# Patient Record
Sex: FEMALE | Race: WHITE | HISPANIC OR LATINO | Employment: FULL TIME | URBAN - METROPOLITAN AREA
[De-identification: names, ages, dates, MRNs, and addresses within clinical notes are randomized per-mention and may not be internally consistent; named-entity substitution may affect disease eponyms.]

---

## 2017-01-09 ENCOUNTER — ALLSCRIPTS OFFICE VISIT (OUTPATIENT)
Dept: OTHER | Facility: OTHER | Age: 52
End: 2017-01-09

## 2017-01-18 ENCOUNTER — ALLSCRIPTS OFFICE VISIT (OUTPATIENT)
Dept: OTHER | Facility: OTHER | Age: 52
End: 2017-01-18

## 2017-01-25 ENCOUNTER — TRANSCRIBE ORDERS (OUTPATIENT)
Dept: ADMINISTRATIVE | Facility: HOSPITAL | Age: 52
End: 2017-01-25

## 2017-01-25 DIAGNOSIS — I83.893 SYMPTOMATIC VARICOSE VEINS OF BOTH LOWER EXTREMITIES: Primary | ICD-10-CM

## 2017-01-31 ENCOUNTER — HOSPITAL ENCOUNTER (OUTPATIENT)
Dept: RADIOLOGY | Facility: HOSPITAL | Age: 52
Discharge: HOME | End: 2017-01-31
Attending: SURGERY

## 2017-01-31 DIAGNOSIS — I83.893 SYMPTOMATIC VARICOSE VEINS OF BOTH LOWER EXTREMITIES: ICD-10-CM

## 2017-01-31 PROCEDURE — 93971 EXTREMITY STUDY: CPT

## 2017-04-10 ENCOUNTER — ALLSCRIPTS OFFICE VISIT (OUTPATIENT)
Dept: OTHER | Facility: OTHER | Age: 52
End: 2017-04-10

## 2017-04-18 ENCOUNTER — ALLSCRIPTS OFFICE VISIT (OUTPATIENT)
Dept: OTHER | Facility: OTHER | Age: 52
End: 2017-04-18

## 2017-04-19 ENCOUNTER — ALLSCRIPTS OFFICE VISIT (OUTPATIENT)
Dept: OTHER | Facility: OTHER | Age: 52
End: 2017-04-19

## 2017-04-19 DIAGNOSIS — Z12.31 ENCOUNTER FOR SCREENING MAMMOGRAM FOR MALIGNANT NEOPLASM OF BREAST: ICD-10-CM

## 2017-04-19 DIAGNOSIS — R63.5 ABNORMAL WEIGHT GAIN: ICD-10-CM

## 2017-04-19 DIAGNOSIS — E66.01 MORBID (SEVERE) OBESITY DUE TO EXCESS CALORIES (HCC): ICD-10-CM

## 2017-04-19 DIAGNOSIS — I83.893 VARICOSE VEINS OF BILATERAL LOWER EXTREMITIES WITH OTHER COMPLICATIONS: ICD-10-CM

## 2017-05-10 ENCOUNTER — ALLSCRIPTS OFFICE VISIT (OUTPATIENT)
Dept: OTHER | Facility: OTHER | Age: 52
End: 2017-05-10

## 2017-05-25 ENCOUNTER — ALLSCRIPTS OFFICE VISIT (OUTPATIENT)
Dept: OTHER | Facility: OTHER | Age: 52
End: 2017-05-25

## 2017-05-25 DIAGNOSIS — R92.8 OTHER ABNORMAL AND INCONCLUSIVE FINDINGS ON DIAGNOSTIC IMAGING OF BREAST: ICD-10-CM

## 2017-05-25 DIAGNOSIS — Z12.31 ENCOUNTER FOR SCREENING MAMMOGRAM FOR MALIGNANT NEOPLASM OF BREAST: ICD-10-CM

## 2017-06-16 ENCOUNTER — HOSPITAL ENCOUNTER (OUTPATIENT)
Dept: RADIOLOGY | Age: 52
Discharge: HOME | End: 2017-06-16
Payer: COMMERCIAL

## 2017-06-16 DIAGNOSIS — Z12.31 ENCOUNTER FOR SCREENING MAMMOGRAM FOR MALIGNANT NEOPLASM OF BREAST: ICD-10-CM

## 2017-06-16 PROCEDURE — 77063 BREAST TOMOSYNTHESIS BI: CPT

## 2017-06-16 PROCEDURE — G0202 SCR MAMMO BI INCL CAD: HCPCS

## 2017-06-27 ENCOUNTER — HOSPITAL ENCOUNTER (OUTPATIENT)
Dept: ULTRASOUND IMAGING | Facility: CLINIC | Age: 52
Discharge: HOME | End: 2017-06-27
Payer: COMMERCIAL

## 2017-06-27 ENCOUNTER — GENERIC CONVERSION - ENCOUNTER (OUTPATIENT)
Dept: OTHER | Facility: OTHER | Age: 52
End: 2017-06-27

## 2017-06-27 ENCOUNTER — HOSPITAL ENCOUNTER (OUTPATIENT)
Dept: MAMMOGRAPHY | Facility: CLINIC | Age: 52
Discharge: HOME | End: 2017-06-27
Payer: COMMERCIAL

## 2017-06-27 DIAGNOSIS — R92.8 OTHER ABNORMAL AND INCONCLUSIVE FINDINGS ON DIAGNOSTIC IMAGING OF BREAST: ICD-10-CM

## 2017-06-27 DIAGNOSIS — R92.8 ABNORMAL MAMMOGRAM, UNSPECIFIED: ICD-10-CM

## 2017-06-27 PROCEDURE — G0206 DX MAMMO INCL CAD UNI: HCPCS

## 2017-06-27 PROCEDURE — 76642 ULTRASOUND BREAST LIMITED: CPT

## 2017-06-28 ENCOUNTER — GENERIC CONVERSION - ENCOUNTER (OUTPATIENT)
Dept: OTHER | Facility: OTHER | Age: 52
End: 2017-06-28

## 2017-07-06 ENCOUNTER — ALLSCRIPTS OFFICE VISIT (OUTPATIENT)
Dept: OTHER | Facility: OTHER | Age: 52
End: 2017-07-06

## 2017-09-20 ENCOUNTER — GENERIC CONVERSION - ENCOUNTER (OUTPATIENT)
Dept: OTHER | Facility: OTHER | Age: 52
End: 2017-09-20

## 2017-12-11 DIAGNOSIS — R92.8 OTHER ABNORMAL AND INCONCLUSIVE FINDINGS ON DIAGNOSTIC IMAGING OF BREAST: ICD-10-CM

## 2017-12-11 DIAGNOSIS — M62.838 OTHER MUSCLE SPASM: ICD-10-CM
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
## 2017-12-11 DIAGNOSIS — M54.2 CERVICALGIA: ICD-10-CM

## 2017-12-22 ENCOUNTER — GENERIC CONVERSION - ENCOUNTER (OUTPATIENT)
Dept: OTHER | Facility: OTHER | Age: 52
End: 2017-12-22

## 2017-12-28 ENCOUNTER — HOSPITAL ENCOUNTER (OUTPATIENT)
Dept: MAMMOGRAPHY | Facility: CLINIC | Age: 52
Discharge: HOME | End: 2017-12-28
Payer: COMMERCIAL

## 2017-12-28 DIAGNOSIS — R92.8 OTHER ABNORMAL AND INCONCLUSIVE FINDINGS ON DIAGNOSTIC IMAGING OF BREAST: ICD-10-CM

## 2017-12-28 PROCEDURE — G0206 DX MAMMO INCL CAD UNI: HCPCS

## 2017-12-28 PROCEDURE — G0279 TOMOSYNTHESIS, MAMMO: HCPCS

## 2018-01-09 ENCOUNTER — APPOINTMENT (OUTPATIENT)
Dept: PHYSICAL THERAPY | Facility: CLINIC | Age: 53
End: 2018-01-09
Payer: COMMERCIAL

## 2018-01-09 DIAGNOSIS — M54.2 CERVICALGIA: ICD-10-CM

## 2018-01-09 DIAGNOSIS — M62.838 OTHER MUSCLE SPASM: ICD-10-CM
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
## 2018-01-09 PROCEDURE — G8985 CARRY GOAL STATUS: HCPCS

## 2018-01-09 PROCEDURE — 97162 PT EVAL MOD COMPLEX 30 MIN: CPT

## 2018-01-09 PROCEDURE — G8984 CARRY CURRENT STATUS: HCPCS

## 2018-01-13 VITALS
SYSTOLIC BLOOD PRESSURE: 118 MMHG | WEIGHT: 293 LBS | HEART RATE: 96 BPM | BODY MASS INDEX: 47.09 KG/M2 | TEMPERATURE: 97.7 F | DIASTOLIC BLOOD PRESSURE: 80 MMHG | HEIGHT: 66 IN

## 2018-01-13 VITALS
HEART RATE: 82 BPM | HEIGHT: 66 IN | DIASTOLIC BLOOD PRESSURE: 78 MMHG | BODY MASS INDEX: 47.09 KG/M2 | SYSTOLIC BLOOD PRESSURE: 134 MMHG | WEIGHT: 293 LBS | TEMPERATURE: 98.1 F

## 2018-01-14 VITALS
WEIGHT: 293 LBS | SYSTOLIC BLOOD PRESSURE: 122 MMHG | HEIGHT: 66 IN | HEART RATE: 80 BPM | BODY MASS INDEX: 47.09 KG/M2 | DIASTOLIC BLOOD PRESSURE: 80 MMHG | TEMPERATURE: 97.5 F

## 2018-01-14 VITALS
TEMPERATURE: 97.8 F | HEART RATE: 86 BPM | WEIGHT: 293 LBS | HEIGHT: 66 IN | BODY MASS INDEX: 47.09 KG/M2 | DIASTOLIC BLOOD PRESSURE: 80 MMHG | SYSTOLIC BLOOD PRESSURE: 142 MMHG

## 2018-01-14 VITALS
WEIGHT: 292 LBS | SYSTOLIC BLOOD PRESSURE: 150 MMHG | DIASTOLIC BLOOD PRESSURE: 99 MMHG | HEIGHT: 66 IN | BODY MASS INDEX: 46.93 KG/M2

## 2018-01-15 VITALS
SYSTOLIC BLOOD PRESSURE: 154 MMHG | HEART RATE: 78 BPM | HEIGHT: 66 IN | TEMPERATURE: 97.5 F | DIASTOLIC BLOOD PRESSURE: 100 MMHG

## 2018-01-16 ENCOUNTER — APPOINTMENT (OUTPATIENT)
Dept: PHYSICAL THERAPY | Facility: CLINIC | Age: 53
End: 2018-01-16
Payer: COMMERCIAL

## 2018-01-16 NOTE — MISCELLANEOUS
Reason For Visit  Reason For Visit Free Text Note Form: Assistance with Medical Coverage for possible medical procedure     Case Management Documentation [facility]:   Information obtained from the patient and medical record  Patient's financial status no medical insurance  She is also dealing with additional issues such as language/communication barrier and chronic/terminal disease  Action Plan: supportive counseling/advocacy  plan reviewed  Progress Note  SW has met with pt to assist as possible with obtaining medical coverage for possible medical procedure  SW spoke with pt via  ID [id number]  Pt has been having vascular problems and may require laser ablation as per MD note  Pt does not have Insurance and is NOT eligible for MA or JAM  Pt is known to our Financial Counselor  Pt Accounts  MD will need to determine if this is an Urgent and Medically required procedure  If so it will get scheduled and get referred to pt accounts  If not will refer pt to [facility] for same  Supportive counseling provided  SW to remain available to support and assist pt as indicated  Active Problems    1  Complicated varicose veins, bilateral (454 8) (P20 621)   2  Encounter to discuss test results (V65 49) (Z71 89)   3  Morbid obesity (278 01) (E66 01)   4  Need for influenza vaccination (V04 81) (Z23)   5  Renal cyst (753 10) (N28 1)   6  Varicose veins (454 9) (I86 8)   7  Venous ulcer of right leg (454 0) (I83 019)   8  Vulvar lesion (624 8) (N90 89)   9  Women's annual routine gynecological examination (V72 31) (Z01 419)    Current Meds   1  No Reported Medications Recorded    Allergies    1  No Known Drug Allergies    2  No Known Environmental Allergies   3   No Known Food Allergies    Future Appointments    Date/Time Provider Specialty Site   04/19/2017 02:15 PM Specialty Clinic, Vascular  [facility]     Signatures   Electronically signed by : Laura Duncan LCSW; Apr 10 2017 5:54PM EST                       (Author)

## 2018-01-16 NOTE — MISCELLANEOUS
Message  telephone call to patient with   Informed of need for 6 month f/u mammogram  pt  states she was made aware and given card by breast center      Active Problems    1  Abnormal mammogram of right breast (793 80) (R92 8)   2  Abnormal weight gain (783 1) (R63 5)   3  Female pelvic pain (625 9) (R10 2)   4  Morbid obesity (278 01) (E66 01)   5  Renal cyst (753 10) (N28 1)   6  Varicose veins of both lower extremities with other complications (200 2) (R73 862)   7  Venous ulcer of right leg (454 0) (I83 019)   8  Visit for screening mammogram (V76 12) (Z12 31)   9  Women's annual routine gynecological examination (V72 31) (Z01 419)    Current Meds   1  Tylenol 325 MG Oral Tablet; Therapy: (Recorded:[id number]) to Recorded    Allergies    1  No Known Drug Allergies    2  No Known Environmental Allergies   3   No Known Food Allergies    Signatures   Electronically signed by : Babatunde Nichols RN; Jun 28 2017  3:46PM EST                       (Author)

## 2018-01-18 ENCOUNTER — APPOINTMENT (OUTPATIENT)
Dept: PHYSICAL THERAPY | Facility: CLINIC | Age: 53
End: 2018-01-18
Payer: COMMERCIAL

## 2018-01-18 ENCOUNTER — ALLSCRIPTS OFFICE VISIT (OUTPATIENT)
Dept: OTHER | Facility: OTHER | Age: 53
End: 2018-01-18

## 2018-01-18 DIAGNOSIS — R10.2 PELVIC AND PERINEAL PAIN: ICD-10-CM

## 2018-01-18 DIAGNOSIS — Z11.3 ENCOUNTER FOR SCREENING FOR INFECTIONS WITH PREDOMINANTLY SEXUAL MODE OF TRANSMISSION: ICD-10-CM

## 2018-01-18 LAB
BACTERIA UR QL AUTO: NORMAL
CLUE CELL (HISTORICAL): NORMAL
HYPHAL YEAST (HISTORICAL): NORMAL
KOH PREP (HISTORICAL): NORMAL
PH UR STRIP.AUTO: 4 [PH]
TRICHOMONAS (HISTORICAL): NORMAL

## 2018-01-18 PROCEDURE — 87491 CHLMYD TRACH DNA AMP PROBE: CPT | Performed by: NURSE PRACTITIONER

## 2018-01-18 PROCEDURE — 87591 N.GONORRHOEAE DNA AMP PROB: CPT | Performed by: NURSE PRACTITIONER

## 2018-01-19 ENCOUNTER — LAB REQUISITION (OUTPATIENT)
Dept: LAB | Facility: HOSPITAL | Age: 53
End: 2018-01-19

## 2018-01-19 DIAGNOSIS — Z11.3 ENCOUNTER FOR SCREENING FOR INFECTIONS WITH PREDOMINANTLY SEXUAL MODE OF TRANSMISSION: ICD-10-CM

## 2018-01-19 LAB
CHLAMYDIA DNA CVX QL NAA+PROBE: NORMAL
N GONORRHOEA DNA GENITAL QL NAA+PROBE: NORMAL

## 2018-01-22 VITALS
HEIGHT: 66 IN | TEMPERATURE: 98.1 F | DIASTOLIC BLOOD PRESSURE: 98 MMHG | SYSTOLIC BLOOD PRESSURE: 156 MMHG | HEART RATE: 82 BPM | WEIGHT: 293 LBS | BODY MASS INDEX: 47.09 KG/M2

## 2018-01-23 ENCOUNTER — APPOINTMENT (OUTPATIENT)
Dept: PHYSICAL THERAPY | Facility: CLINIC | Age: 53
End: 2018-01-23
Payer: COMMERCIAL

## 2018-01-23 VITALS
HEIGHT: 65 IN | HEART RATE: 75 BPM | DIASTOLIC BLOOD PRESSURE: 86 MMHG | WEIGHT: 293 LBS | BODY MASS INDEX: 48.82 KG/M2 | SYSTOLIC BLOOD PRESSURE: 126 MMHG

## 2018-01-24 VITALS
WEIGHT: 293 LBS | TEMPERATURE: 97.7 F | SYSTOLIC BLOOD PRESSURE: 124 MMHG | HEIGHT: 65 IN | DIASTOLIC BLOOD PRESSURE: 90 MMHG | HEART RATE: 100 BPM | BODY MASS INDEX: 48.82 KG/M2

## 2018-01-25 ENCOUNTER — APPOINTMENT (OUTPATIENT)
Dept: PHYSICAL THERAPY | Facility: CLINIC | Age: 53
End: 2018-01-25
Payer: COMMERCIAL

## 2018-02-14 PROBLEM — I83.90 VARICOSE VEINS OF LOWER EXTREMITY: Status: ACTIVE | Noted: 2017-01-18

## 2018-02-14 NOTE — PROGRESS NOTES
Assessment/Plan:    I have sent an approximate 2 year pharmacy to take as needed up to twice a day for pain  I have sent cyclobenzaprine a muscle relaxant that you can take as needed more at night if the pain flares up  Continue plenty of liquids and rest for cold  If right-sided back pain does not improve or gets worse then return to the office for an appointment  No problem-specific Assessment & Plan notes found for this encounter  Diagnoses and all orders for this visit:    Need for influenza vaccination    Acute right-sided low back pain without sciatica    Viral upper respiratory tract infection    Other orders  -     Cancel: Occult Bloood,Fecal Immunochemical; Future  -     Cancel: Flu vaccine greater than or equal to 4yo preservative free IM          Subjective:      Patient ID: Tejal Garcia is a 45 y o  female  Patient here with c/o R sided lower lateral back pain  Reports started about 20 days ago but is increasing  Denies any trauma or injury although 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  States feels a lump in the area and is pain to touch  But when ask to point to area no pain to  Touch  Patient reports when has taken naproxen for this pain the pain will completely go away for 3 days  Patient reports during this time she has no pain with working or at rest   Patient reports after 3 days the pain does return  Reports went for the ultrasound for gyn today  Patient reports also getting over a cold from 2 , reports still has a mild cough but it is significantly improving  Aware f/u diagnostic mammo in Dec was normal and to return to routine bilat screening ammo June 2018  The following portions of the patient's history were reviewed and updated as appropriate: allergies, current medications, past family history, past medical history, past social history, past surgical history and problem list     Review of Systems   Constitutional: Negative  HENT: Positive for congestion  Respiratory: Positive for cough  As noted in HPI   Cardiovascular: Negative  Gastrointestinal: Negative  Genitourinary: Negative for decreased urine volume, difficulty urinating and frequency  No change to bowel or bladder function  Musculoskeletal: Positive for back pain and myalgias  Neurological: Negative  Negative for weakness and numbness  Hematological: Negative  Psychiatric/Behavioral: Negative  Objective:    Vitals:    02/15/18 1456   BP: 120/82   Pulse: 80   Temp: 97 9 °F (36 6 °C)        Physical Exam   Constitutional: She appears well-developed and well-nourished  Cardiovascular: Normal rate, regular rhythm and normal heart sounds  Exam reveals no gallop and no friction rub  No murmur heard  Pulmonary/Chest: Effort normal and breath sounds normal  She has no wheezes  Abdominal: Soft  Obese exam limited by body habitus no organomegaly noted     Musculoskeletal:        Back:

## 2018-02-15 ENCOUNTER — OFFICE VISIT (OUTPATIENT)
Dept: INTERNAL MEDICINE CLINIC | Facility: CLINIC | Age: 53
End: 2018-02-15

## 2018-02-15 VITALS
TEMPERATURE: 97.9 F | HEART RATE: 80 BPM | WEIGHT: 293 LBS | SYSTOLIC BLOOD PRESSURE: 120 MMHG | HEIGHT: 65 IN | BODY MASS INDEX: 48.82 KG/M2 | DIASTOLIC BLOOD PRESSURE: 82 MMHG

## 2018-02-15 DIAGNOSIS — J06.9 VIRAL UPPER RESPIRATORY TRACT INFECTION: ICD-10-CM

## 2018-02-15 DIAGNOSIS — Z23 NEED FOR INFLUENZA VACCINATION: Primary | ICD-10-CM

## 2018-02-15 DIAGNOSIS — M54.50 ACUTE RIGHT-SIDED LOW BACK PAIN WITHOUT SCIATICA: ICD-10-CM

## 2018-02-15 PROCEDURE — 99213 OFFICE O/P EST LOW 20 MIN: CPT | Performed by: PHYSICIAN ASSISTANT

## 2018-02-15 RX ORDER — NAPROXEN 500 MG/1
500 TABLET ORAL 2 TIMES DAILY WITH MEALS
Qty: 40 TABLET | Refills: 0 | Status: SHIPPED | OUTPATIENT
Start: 2018-02-15 | End: 2018-04-01 | Stop reason: ALTCHOICE

## 2018-02-15 RX ORDER — CYCLOBENZAPRINE HCL 10 MG
10 TABLET ORAL 2 TIMES DAILY PRN
Qty: 14 TABLET | Refills: 0 | Status: SHIPPED | OUTPATIENT
Start: 2018-02-15 | End: 2018-04-01 | Stop reason: ALTCHOICE

## 2018-02-15 NOTE — PATIENT INSTRUCTIONS
Dolor mayra de espalda inferior   LO QUE NECESITA SABER:   ¿Qué es el dolor mayra de la región inferior de la espalda? El dolor mayra de la región lumbar de la espalda es karyn molestia repentina en la parte inferior de echeverria espalda que dura hasta por 6 semanas  La molestia hace que sea dificil que usted tolere la [place]  ¿Qué causa o aumenta mi riesgo de tener dolor mayra de la parte inferior de la espalda? Las condiciones que afectan la columna, las articulaciones, o los músculos pueden causar el dolor de espalda  Estos pueden incluir la artritis, estenosis de la isiah dorsal (estrechamiento de la columna vertebral), tensión muscular o descomposición de los discos de la columna vertebral  Los siguientes aumentan echeverria riesgo de presentar dolor de espalda:  · Inclinarse o levantar de karyn forma repetitiva o girar o levantar artículos pesados    · Lesión debido a karyn caída o accidente    · Falta de actividad física regular     · Obesidad, embarazo     · Fumar    · Envejecimiento    · Manejar, estar sentado o de pie por mucho tiempo    · Zhane postura mientras está sentado o de pie  ¿Cómo se diagnostica el dolor mayra de la parte inferior de la espalda? Echeverria médico le preguntará acerca de echeverria historial médico y lo examinará  Puede que le pregunte cuándo fue echeverria último dolor de lumbago de la espalda cómo fue que comenzó  Muéstrele dónde siente el dolor y qué lo mejora o lo Kathyrn Peasant  Dígale acerca del tipo de dolor que tiene, qué tan cha es, y cuánto tiempo dura  Dígale si el dolor empeora por las noches o cuando se [place]  ¿Cómo se trata el dolor lumbar mayra? La meta del tratamiento es aliviar echeverria dolor y ayudarlo a tolerar la [place]  La mayoría de las personas que tienen dolor mayra de la parte inferior de la espalda se mejoran entre unas 4 a 6 semanas  Es posible que usted necesite alguno de los siguientes:  · Medicamentos:      ¨ El acetaminofén  gio el dolor   Está disponible sin receta médica  Pregunte la cantidad y la frecuencia con que debe tomarlos  [address]  El acetaminofén puede causar daño en el hígado cuando no se norm de forma correcta  ¨ AINEs (Analgésicos antiinflamatorios no esteroides)  ayudan a disminuir la inflamación y el dolor  Julissa medicamento esta disponible con o sin karyn receta médica  Los AINEs pueden causar sangrado estomacal o problemas renales en ciertas personas  Si usted norm un medicamento anticoagulante, siempre pregúntele a echeverria médico si los DINORAH son seguros para usted  Siempre harley la etiqueta de julissa medicamento y [place] instrucciones  ¨ Un medicamento con receta para el dolor  podrían ser Elnor Keel  Pregunte al médico cómo debe olive julissa medicamento de forma ortiz  ¨ Relajantes musculares  disminuyen el dolor y [employer] músculos de la parte inferior de la columna  ¿Qué puedo hacer para prevenir el dolor mayra de la parte inferior de la espalda? · Use la mecánica corporal adecuada  ¨ Flexione la cadera y las rodillas cuando [place] [employer] a levantar un objeto  No doble la cintura  Utilice los [employer] de las piernas mientras levanta echeverria carga  No use echeverria espalda  Mantenga el objeto cerca de echeverria pecho mientras lo levanta  No se tuerza, ni levante cualquier cosa por encima de echeverria cintura  ¨ Cambie echeverria posición frecuentemente cuando pase mucho tiempo de pie  Descanse un pie sobre karyn Vonzella Ndiaye o un reposapiés e intercambie con el otro pie frecuentemente  ¨ No permanezca sentado por lapsos de tiempo prolongados  Cuando sea necesario hacerlo, siéntese en karyn silla de respaldo recto con los pies apoyados en el suelo  Nunca alcance, jale ni empuje mientras se encuentra sentando  · Rohith ejercicios que fortalezcan aneta músculos de la espalda  Entre en calor antes de hacer ejercicio  Consulte con echeverria médico sobre [employer] plan de ejercicios para usted  · Mantenga un peso saludable  Consulte con echeverria médico cuánto debería pesar   Pida que le ayude a crear un plan para bajar de peso si usted tiene sobrepeso  ¿Cómo puedo cuidarme si tengo dolor en la parte inferior de la espalda? · Manténgase activo  lo más que pueda sin causar [employer]  El reposo en cama puede empeorar echeverria dolor de espalda  Comience con ejercicios ligeros virgie caminar  Evite levantar objetos hasta que ya no tenga dolor  Solicite más información acerca de las actividades físicas o plan de ejercicios que son los adecuados para usted  · El hielo  ayuda a disminuir la inflamación, el dolor y los espasmos musculares  Ponga hielo ion en karyn bolsa plástica  Cúbrala con karyn toalla  Aplíquela en echeverria luna lumbar por 20 a 30 minutos cada 2 horas  Rohith esto por 2 a 3 días después que el dolor empiece, o según lo indicado  · El calor  ayuda a disminuir dolor y espasmos musculares  Empiece a utilizar calor después de jaison terminado el tratamiento con el hielo  Utilice karyn toalla pequeña empapada con [place], karyn almohada térmica o tome un baño de sabrina con agua tibia  Aplíquese calor en el área lesionada barby 20 a 30 minutos cada 2 horas barby la cantidad de [employer] indiquen  [place] entre el calor y el hielo  ¿Cuándo reid comunicarme con mi médico?   · Usted tiene fiebre  · Usted tiene un dolor por la noche o cuando descansa  · Echeverria dolor no mejora con el tratamiento  · Usted tiene dolor que empeora cuando tose o estornuda  · Usted siente un estallido o chasquido repentino en echeverria espalda  · Usted tiene preguntas o inquietudes acerca de echeverria condición o cuidado  ¿Cuándo reid buscar atención inmediata o llamar al 911? · Usted tiene dolor intenso  · Usted repentinamente tiene rigidez o siente pesadez en ambos glúteos hacia abajo de ambas piernas  · Usted tiene entumecimiento o debilidad en karyn pierna o dolor en ambas piernas  · Usted tiene entumecimiento en el área genital o en la región lumbar      · Usted no puede controlar echeverria orina ni aneta deposiciones intestinales  ACUERDOS SOBRE DELEON CUIDADO:   Usted tiene el derecho de ayudar a planear deleon cuidado  Aprenda todo lo que pueda sobre deleon condición y virgie darle tratamiento  Discuta aneta opciones de tratamiento con aneta médicos para decidir el cuidado que usted desea recibir  Usted siempre tiene el derecho de rechazar el tratamiento  Esta información es sólo para uso en educación  Deleon intención no es darle un consejo médico sobre enfermedades o tratamientos  Colsulte con deleon [place] farmacéutico antes de seguir cualquier régimen médico para saber si es seguro y efectivo para usted  © 2017 [address] Information is for End User's use only and may not be sold, redistributed or otherwise used for commercial purposes  All illustrations and images included in CareNotes® are the copyrighted property of [employer] , Inc  or Jeff Lebron

## 2018-02-22 ENCOUNTER — TELEPHONE (OUTPATIENT)
Dept: LAB | Facility: CLINIC | Age: 53
End: 2018-02-22

## 2018-03-22 ENCOUNTER — HOSPITAL ENCOUNTER (OUTPATIENT)
Dept: RADIOLOGY | Facility: HOSPITAL | Age: 53
Discharge: HOME | End: 2018-03-22

## 2018-03-22 DIAGNOSIS — R10.2 PELVIC AND PERINEAL PAIN: ICD-10-CM

## 2018-03-22 PROCEDURE — 76856 US EXAM PELVIC COMPLETE: CPT

## 2018-03-22 PROCEDURE — 76830 TRANSVAGINAL US NON-OB: CPT

## 2018-03-23 ENCOUNTER — TELEPHONE (OUTPATIENT)
Dept: OBGYN CLINIC | Facility: HOSPITAL | Age: 53
End: 2018-03-23

## 2018-04-06 ENCOUNTER — OFFICE VISIT (OUTPATIENT)
Dept: OBGYN CLINIC | Facility: CLINIC | Age: 53
End: 2018-04-06

## 2018-04-06 VITALS
DIASTOLIC BLOOD PRESSURE: 83 MMHG | SYSTOLIC BLOOD PRESSURE: 140 MMHG | BODY MASS INDEX: 49.49 KG/M2 | WEIGHT: 293 LBS | HEART RATE: 74 BPM

## 2018-04-06 DIAGNOSIS — N84.0 ENDOMETRIAL POLYP: Primary | ICD-10-CM

## 2018-04-06 PROCEDURE — 99203 OFFICE O/P NEW LOW 30 MIN: CPT | Performed by: OBSTETRICS & GYNECOLOGY

## 2018-04-06 NOTE — PROGRESS NOTES
Wykush Handycolton Guerrero 1965 female MRN: [id number]    Acute Visit    ASSESSMENT/PLAN  Raad Hester is a 52 y o  female with significant PmhX     1) Endometrial Polyp  -> Dr Alfonzo Zelaya discussed with the patient about the US performed on 3/22 that showed   Echogenic polypoid lesion with feeding vessel, which may represent an endometrial polyp though neoplasm cannot be excluded   -> He would like to get a reread on this reading to confirm the Dx  Given that the lesion is 0 7 likely unbenefical that the patient would benefit from EMB  Therefore we would recommended a D/C be performed  - Patient will be meeting with Daya Dixon for further evaluation about [facility] care coverage  Patient understands the above  Interpreted by myself in [place]ic    Disposition: RTC in 1 week    Future Appointments  Date Time Provider Sydnee Robbins   4/13/2018 10:45 AM OBGYN RESIDENT [facility] [address] [facility]          SUBJECTIVE  CC: Results (Pt here for results of her US )      HPI:  Raad Hester is a 52 y o  female who presents for a follow up about US results  Currently the patient is asymtomatic  She states that she hasn't had any pelvic pain for 6 months  She stated that she had a vulvar biopsy back > 2 years that was leading to her pain  She denies any weight loss or FmHx of cancer  Patient has a D/C with one of her fetal demise  The patient denies irregular vaginal bleeding  Review of Systems   Constitutional: Negative for activity change and appetite change  HENT: Negative for congestion and sore throat  Respiratory: Negative for cough, chest tightness and shortness of breath  Cardiovascular: Negative for chest pain  Gastrointestinal: Negative for abdominal distention and abdominal pain  Musculoskeletal: Negative for arthralgias and back pain  Skin: Negative for rash  Neurological: Negative for dizziness     All other systems reviewed and are negative  Historical Information   The patient history was reviewed as follows:  Past Medical History:   Diagnosis Date    Breast lump     last assessed: 2014    History of varicose veins     last assessed: 2017    Irritable bowel syndrome     last assessed: 2012     (spontaneous vaginal delivery)     last assessed: 2016         Past Surgical History:   Procedure Laterality Date    APPENDECTOMY      last assessed: 10/21/2014    GALLBLADDER SURGERY      last assessed: 10/21/2014    HERNIA REPAIR      TUBAL LIGATION      last assessed: 2016     Family History   Problem Relation Age of Onset    Diabetes Mother     Kidney disease Mother       Social History   History   Alcohol Use No     History   Drug Use No     History   Smoking Status    Never Smoker   Smokeless Tobacco    Not on file       Medications:   No current outpatient prescriptions on file  No Known Allergies    OBJECTIVE  Vitals:   Vitals:    18 0814   BP: 140/83   BP Location: Left arm   Patient Position: Sitting   Cuff Size: Large   Pulse: 74   Weight: 135 kg (297 lb 6 4 oz)         Physical Exam   Constitutional: She is oriented to person, place, and time  She appears well-developed and well-nourished  Eyes: Conjunctivae and EOM are normal  Pupils are equal, round, and reactive to light  Abdominal: Soft  Bowel sounds are normal  She exhibits no distension  There is no tenderness  Musculoskeletal: Normal range of motion  She exhibits no edema  Neurological: She is alert and oriented to person, place, and time  Skin: Skin is warm and dry  No rash noted  Psychiatric: She has a normal mood and affect   Her behavior is normal  Judgment and thought content normal           Carmina Landry MD, PGY-3  [facility]   2018

## 2018-04-13 ENCOUNTER — OFFICE VISIT (OUTPATIENT)
Dept: OBGYN CLINIC | Facility: CLINIC | Age: 53
End: 2018-04-13

## 2018-04-13 VITALS
HEIGHT: 65 IN | SYSTOLIC BLOOD PRESSURE: 145 MMHG | DIASTOLIC BLOOD PRESSURE: 89 MMHG | WEIGHT: 293 LBS | HEART RATE: 88 BPM | BODY MASS INDEX: 48.82 KG/M2

## 2018-04-13 DIAGNOSIS — N84.0 ENDOMETRIAL POLYP: Primary | ICD-10-CM

## 2018-04-13 PROCEDURE — 99213 OFFICE O/P EST LOW 20 MIN: CPT | Performed by: OBSTETRICS & GYNECOLOGY

## 2018-04-13 NOTE — PROGRESS NOTES
[facility] 1965 [facility]: [id number]     Acute Visit    ASSESSMENT/PLAN    Endometrial Polyp: plan per Dr Shiva Matthews is to have D&C for evaluation of echogenic polypoid lesion to evaluate for malignancy  Patient spoke with Dotty Lema about insurance concerns  Dotty Lema is mailing patient paperwork to complete  Advised patient that once paperwork is complete per Dotty Lema she will need an apt [facility] for pre surgery apt to schedule her D&C  Patient expressed understanding  All questions were answered  Visit competed with  phone #[phone]      No future appointments  SUBJECTIVE  CC: Follow-up (F/u polyp )      HPI:  Madi Viramontes is a 52 y o  female who presents for follow up ;  Per previous visit note from Dr Ramona Crawford patient had echogenic polypoid lesion with feeding vessel, neoplasm could not be completely excluded  It was decided patient would not benefit from EMB therefore she would need D&C for tissue sampling  Insurance issues were a concern and patient was connected with Dotty Lema for financial assistance  She offers no additional concerns today  Review of Systems   Constitutional: Negative for chills and fever  Respiratory: Negative for cough and shortness of breath  Cardiovascular: Negative for chest pain and palpitations  Genitourinary: Negative for difficulty urinating, dysuria, vaginal bleeding, vaginal discharge and vaginal pain  Neurological: Negative for dizziness, weakness and light-headedness         Historical Information   The patient history was reviewed as follows:  Past Medical History:   Diagnosis Date    Breast lump     last assessed: 2014    Hemorrhoid     History of varicose veins     last assessed: 2017    Irritable bowel syndrome     last assessed: 2012     (spontaneous vaginal delivery)     last assessed: 2016         Past Surgical History:   Procedure Laterality Date    APPENDECTOMY      last assessed: 10/21/2014    GALLBLADDER SURGERY      last assessed: 10/21/2014    HERNIA REPAIR      TUBAL LIGATION      last assessed: 05/12/2016     Family History   Problem Relation Age of Onset    Kidney disease Mother     Heart attack Father     No Known Problems Sister     No Known Problems Brother       Social History   History   Alcohol Use No     History   Drug Use No     History   Smoking Status    Never Smoker   Smokeless Tobacco    Never Used       Medications:   No current outpatient prescriptions on file  No Known Allergies    OBJECTIVE  Vitals:   Vitals:    04/13/18 1054   BP: 145/89   BP Location: Left arm   Patient Position: Sitting   Cuff Size: Large   Pulse: 88   Weight: 134 kg (295 lb 3 2 oz)   Height: 5' 5" (1 651 m)         Physical Exam   Constitutional: She is oriented to person, place, and time  She appears well-developed and well-nourished  No distress  HENT:   Head: Normocephalic and atraumatic  Neurological: She is alert and oriented to person, place, and time  No cranial nerve deficit  Skin: Skin is warm and dry  She is not diaphoretic  Psychiatric: She has a normal mood and affect   Her behavior is normal                   Inga Higuera DO, PGY-3  [facility] Medicine   4/13/2018

## 2018-04-16 PROBLEM — Z01.411 ABNORMAL FEMALE PELVIC EXAM: Status: ACTIVE | Noted: 2018-04-16

## 2018-04-26 ENCOUNTER — OFFICE VISIT (OUTPATIENT)
Dept: OBGYN CLINIC | Facility: HOSPITAL | Age: 53
End: 2018-04-26

## 2018-04-26 VITALS
DIASTOLIC BLOOD PRESSURE: 77 MMHG | HEART RATE: 77 BPM | SYSTOLIC BLOOD PRESSURE: 150 MMHG | WEIGHT: 293 LBS | BODY MASS INDEX: 47.09 KG/M2 | HEIGHT: 66 IN

## 2018-04-26 DIAGNOSIS — N84.0 ENDOMETRIAL POLYP: Primary | ICD-10-CM

## 2018-04-26 PROCEDURE — 88305 TISSUE EXAM BY PATHOLOGIST: CPT | Performed by: PATHOLOGY

## 2018-04-26 PROCEDURE — 58100 BIOPSY OF UTERUS LINING: CPT | Performed by: OBSTETRICS & GYNECOLOGY

## 2018-04-26 NOTE — PROGRESS NOTES
Assessment/Plan      Assessment:    53 yo Perimenopausal female ( LMP 2017)  here for follow up for possible endometrial polyp and new left sided hydrosalpinx seem on ultrasound from 3/22/18  Patient has a Hx of pelvic pain that has since resolved  Plan:    1  Possible endometrial  polyp: about 0 8 cm  with feeding vessels seen on US from 3/22/18  She also has a thickened endometrial strip at 12 mm, and trace fluid within the endometri  BMI elevated at 48  which is a risk factor for hyperplasia/malignancy  LMP 2017  Patient has an EMB done today to evaluate the endometrium and to r/o hyperplasia vs  Malignancy  If the EMB is normal , she will eventually need a Hysteroscopy D&C and endometrial polypectomy if the polypoid  lesion persist   If the EMB is abnormal with concerns for  malignancy , she will need GYN onc referral  Patient is in agreement with plan  The patient was advised to call for any fever or for prolonged or severe pain or bleeding  She was advised to use OTC analgesics as needed for mild to moderate pain  She was advised to avoid vaginal intercourse for 48 hours or until the bleeding has completely stopped  2  Suspected  left sided hydrosalpinx: New finding, not present on previous US from 2016  Patient is currently asymptomatic  Recommendations is for short interval follow up at this time at 8 weeks  If the Hydrosalpinx  worsnens, or patient becomes symptomatic, consider diagnostic laparoscopy  3  Pelvic pain: per patient no current pelvic pain  GC/CT was wnl  PRN symptomatic relief    4  Follow up: 2 weeks for EMB results, and 8 weeks for follow up US to evaluate the polypoid endometrial lesion  and the left sided hydrosalpinx  Patient needs to follow up with her PCP for a Colonoscopy  Subjective     Patient is a 52 y o   8 para 6, female, perimenopausal female who is here for EMB   Patient was being follow for pelvic pain    Patient last US evalaution was from 3/22/18 and there was a new found polypoid lesion and a new left sided hydrosalpinx  Her endometrial strip was elevated at 12 mm with trace fluid noted within the endometrium  Patient has elevated BMI at 48 which is a risk factor for endometrial cancer  The results from the EMB will be use to guide management decision for the patient  Pertinent Gyn History:  Menses: LMP 12/2017  Contraception: tubal ligation  Preventive screening:   Last mammogram: normal Date: 12/2017  Last pap: normal Date: 5/2016  Colonoscopy: Patient has not has her colonoscopy    The following portions of the patient's history were reviewed and updated as appropriate: allergies, current medications, past family history, past medical history, past social history, past surgical history and problem list     Review of Systems  Pertinent items are noted in HPI  Objective     Vital signs in last 24 hours:  [unfilled]    Endometrial Biopsy Procedure Note    Pre-operative Diagnosis: Possible endometrial polyp, Thickened endometrial strip, Trace fluid within endometrium    Post-operative Diagnosis: same    Indications: Possible endometrial polyp, thickened endometrial strip, trace fluid witjin endometrium    Procedure Details    The risks (including infection, bleeding, pain, and uterine perforation) and benefits of the procedure were explained to the patient and Written informed consent was obtained  The patient was placed in the dorsal lithotomy position  Bimanual exam showed the uterus to be in the neutral position  A speculum inserted in the vagina, and the cervix prepped with povidone iodine  A sharp tenaculum was applied to the anterior lip of the cervix for stabilization  The cervix easy passage  A sterile uterine sound was used to sound the uterus to a depth of 8 5cm  A Pipelle endometrial aspirator was used to sample the endometrium  A(n) moderate amount of tissue was obtained   Sample was sent for pathologic examination  Condition:  Stable    Complications:  None      Data Review:   Pelvic US 3/22/2018:    UTERUS:  The uterus is anteverted in position, measuring 9 7 x 4 7 x 6 4 cm  Contour and echotexture appear normal   The cervix shows no suspicious abnormality      ENDOMETRIUM:    Caliber of 12 mm  There is a 0 7 x 0 8 x 0 5 cm polypoid lesion with feeding vessel present  There is trace fluid within endometrial canal      OVARIES/ADNEXA:  Right ovary:  2 0 x 1 5 x 1 6 cm  No suspicious right ovarian abnormality  Doppler flow within normal limits      Left ovary:  2 1 x 1 1 x 2 0 cm  No suspicious left ovarian abnormality  Doppler flow within normal limits      There is a cystic tubular structure within the left adnexa adjacent to left ovary, which may represent dilated fallopian tube  No solid adnexal masses identified  There is no free fluid      IMPRESSION:        1   Echogenic polypoid lesion with feeding vessel, which may represent an endometrial polyp though neoplasm cannot be excluded  Tissue sampling recommended         2  Cystic tubular structure within the left adnexa adjacent to left ovary, likely representing hydrosalpinx  No solid adnexal mass identified  Follow-up ultrasound in 6-12 weeks recommended         Benita Gentile   4/26/2018

## 2018-06-13 ENCOUNTER — TELEPHONE (OUTPATIENT)
Dept: OBGYN CLINIC | Facility: HOSPITAL | Age: 53
End: 2018-06-13

## 2018-06-13 NOTE — TELEPHONE ENCOUNTER
Spoke with pt regarding her need for pelvic ultrasound  Pt stated she had health issues, and will call central scheduling, number provided, to make appt for ultrasound  Pt agreed

## 2018-06-18 ENCOUNTER — TRANSCRIBE ORDERS (OUTPATIENT)
Dept: ADMINISTRATIVE | Facility: HOSPITAL | Age: 53
End: 2018-06-18

## 2018-06-19 ENCOUNTER — HOSPITAL ENCOUNTER (OUTPATIENT)
Dept: RADIOLOGY | Facility: HOSPITAL | Age: 53
Discharge: HOME | End: 2018-06-19

## 2018-06-19 DIAGNOSIS — N84.0 ENDOMETRIAL POLYP: ICD-10-CM

## 2018-06-19 PROCEDURE — 76830 TRANSVAGINAL US NON-OB: CPT

## 2018-06-19 PROCEDURE — 76856 US EXAM PELVIC COMPLETE: CPT

## 2018-07-02 ENCOUNTER — OFFICE VISIT (OUTPATIENT)
Dept: OBGYN CLINIC | Facility: HOSPITAL | Age: 53
End: 2018-07-02

## 2018-07-02 VITALS
BODY MASS INDEX: 50.02 KG/M2 | HEART RATE: 79 BPM | SYSTOLIC BLOOD PRESSURE: 180 MMHG | WEIGHT: 293 LBS | DIASTOLIC BLOOD PRESSURE: 96 MMHG | HEIGHT: 64 IN

## 2018-07-02 DIAGNOSIS — N84.0 ENDOMETRIAL POLYP: Primary | ICD-10-CM

## 2018-07-02 PROCEDURE — 99213 OFFICE O/P EST LOW 20 MIN: CPT | Performed by: OBSTETRICS & GYNECOLOGY

## 2018-07-02 NOTE — PROGRESS NOTES
Assessment:  51 yo post menopausal female (LMP 12/2017) presents for follow-up to ultrasound had been ordered to evaluate pelvic pain  Plan:  Most recent Pap May 2016:  Not due until May 2021    Elevated blood pressurees in office today: to follow up with PCP for blood pressure check top and colonoscopy    Focal hypervascularity within the endometrium: Likely due to endometrial polyp; RTC for H&P for d&c and hysteroscopy    Cystic tubular structure and left adnexal region previously seen on ultrasound in March: Resolved on June ultrasound    Abnormal uterine bleeding:  Denies    Pelvic pain:  Denies; resolved approximately 8 days after endometrial biopsy was taken    Subjective:  Patient presents for follow-up from previous visit in April 2018  Objective:  See pelvic ultrasound June 19, 2018 versus March 22, 2018  Body mass index is 51 49 kg/m²

## 2018-09-05 ENCOUNTER — TELEPHONE (OUTPATIENT)
Dept: INTERNAL MEDICINE CLINIC | Facility: CLINIC | Age: 53
End: 2018-09-05

## 2018-09-05 DIAGNOSIS — Z12.31 VISIT FOR SCREENING MAMMOGRAM: Primary | ICD-10-CM

## 2018-09-05 NOTE — TELEPHONE ENCOUNTER
Patient needs an order for a mammogram  Please call her when it is done so she can make the appointment

## 2018-09-10 ENCOUNTER — HOSPITAL ENCOUNTER (OUTPATIENT)
Dept: MAMMOGRAPHY | Facility: CLINIC | Age: 53
Discharge: HOME | End: 2018-09-10
Payer: COMMERCIAL

## 2018-09-10 DIAGNOSIS — Z12.31 VISIT FOR SCREENING MAMMOGRAM: ICD-10-CM

## 2018-09-10 PROCEDURE — 77063 BREAST TOMOSYNTHESIS BI: CPT

## 2018-09-10 PROCEDURE — 77067 SCR MAMMO BI INCL CAD: CPT

## 2018-10-11 ENCOUNTER — OFFICE VISIT (OUTPATIENT)
Dept: INTERNAL MEDICINE CLINIC | Facility: CLINIC | Age: 53
End: 2018-10-11

## 2018-10-11 VITALS
HEIGHT: 65 IN | DIASTOLIC BLOOD PRESSURE: 88 MMHG | SYSTOLIC BLOOD PRESSURE: 144 MMHG | WEIGHT: 293 LBS | BODY MASS INDEX: 48.82 KG/M2 | HEART RATE: 88 BPM | TEMPERATURE: 98 F

## 2018-10-11 DIAGNOSIS — M79.89 NODULE OF SOFT TISSUE: ICD-10-CM

## 2018-10-11 DIAGNOSIS — T14.8XXA MUSCLE STRAIN: Primary | ICD-10-CM

## 2018-10-11 PROCEDURE — 99213 OFFICE O/P EST LOW 20 MIN: CPT | Performed by: PHYSICIAN ASSISTANT

## 2018-10-11 RX ORDER — LIDOCAINE 50 MG/G
1 PATCH TOPICAL DAILY
Qty: 10 PATCH | Refills: 0 | Status: SHIPPED | OUTPATIENT
Start: 2018-10-11 | End: 2019-09-17 | Stop reason: ALTCHOICE

## 2018-10-11 NOTE — PATIENT INSTRUCTIONS
Schedule the follow up ultrasound for abdominal nodule  Gentle stretches for muscle pain  Can use the patch if needed for pain  Please check your blood pressure in the pharmacies at least 2-3 X a week and bring the readings to follow up appointment after the ultrasound

## 2018-10-11 NOTE — PROGRESS NOTES
Assessment/Plan:    No problem-specific Assessment & Plan notes found for this encounter  Diagnoses and all orders for this visit:    Nodule of soft tissue  -     US right upper quadrant; Future    Muscle strain  -     lidocaine (LIDODERM) 5 %; Apply 1 patch topically daily for 10 days Remove & Discard patch within 12 hours or as directed by MD          Subjective:      Patient ID: Simba Turk is a 52 y o  female  Pt here for [address] [facility] with c/o R side pain  States has been getting this pain on off for a few weeks when has is very mild and only has after working  As noted in past works as a  with a lot of repetitive movements  Is R handed and reaches up with R arm stretching her side  Patient reports she does this job from 9-1 on most days and 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 often 3 in the morning  Also known RUQ nodule imaging stated most consisted with Lipoma but to have f/u to monitor stability  Patient is not having pain or discomfort in the area of the lipoma  Discussed with patient that her blood pressure is slightly elevated today and review of records show she has had at least 1 other elevated blood pressure reading however that was during a gyn procedure  As patient does not have insurance we will still provide her with the form to record her blood pressures and she states she will be able to check them in a pharmacy her store at least 2 or 3 times a week  Patient does admit that she drinks a lot of coffee is specially on the night job stay awake  Patient completed her mammogram in September and is due in 1 year  Patient had her Pap smear through gyn she reports  Currently patient declines colonoscopy or fit testing but will consider when she returns          The following portions of the patient's history were reviewed and updated as appropriate: allergies, current medications, past family history, past medical history, past social history, past surgical history and problem list     Review of Systems   Constitutional: Negative for chills and fever  Respiratory: Negative  Negative for cough and shortness of breath  Cardiovascular: Negative for chest pain and palpitations  Gastrointestinal: Negative for abdominal pain, constipation and diarrhea  Musculoskeletal: Positive for myalgias  Negative for back pain and neck pain  Skin: Negative for rash  Neurological: Negative for numbness  Psychiatric/Behavioral: Negative  Objective:      /88 (BP Location: Right arm, Patient Position: Sitting, Cuff Size: Adult)   Pulse 88   Temp 98 °F (36 7 °C) (Oral)   Ht 5' 5" (1 651 m)   Wt (!) 138 kg (305 lb 1 9 oz)   BMI 50 77 kg/m²          Physical Exam   Constitutional: She is oriented to person, place, and time  She appears well-developed and well-nourished  Cardiovascular: Normal rate, regular rhythm and normal heart sounds  Pulmonary/Chest: Effort normal and breath sounds normal    Abdominal: Soft  Bowel sounds are normal  There is no tenderness  Musculoskeletal: Normal range of motion  She exhibits tenderness  Right shoulder: She exhibits tenderness  She exhibits no spasm  Arms:  Neurological: She is alert and oriented to person, place, and time  Skin: Skin is warm and dry  No rash noted  Psychiatric: She has a normal mood and affect   Her behavior is normal

## 2018-10-25 NOTE — PROGRESS NOTES
RCVD OVERDUE RESULTS REMINDER FOR U/S RIGHT UPPER QUADRANT   PT  WAS MADE AWARE ON 10/11/18 TO HAVE DONE

## 2018-11-28 ENCOUNTER — OFFICE VISIT (OUTPATIENT)
Dept: INTERNAL MEDICINE CLINIC | Facility: CLINIC | Age: 53
End: 2018-11-28

## 2018-11-28 VITALS
WEIGHT: 293 LBS | DIASTOLIC BLOOD PRESSURE: 80 MMHG | BODY MASS INDEX: 48.82 KG/M2 | HEIGHT: 65 IN | TEMPERATURE: 97.6 F | SYSTOLIC BLOOD PRESSURE: 120 MMHG | HEART RATE: 80 BPM

## 2018-11-28 DIAGNOSIS — J06.9 VIRAL UPPER RESPIRATORY TRACT INFECTION: Primary | ICD-10-CM

## 2018-11-28 PROCEDURE — 99213 OFFICE O/P EST LOW 20 MIN: CPT | Performed by: PHYSICIAN ASSISTANT

## 2018-11-28 NOTE — PATIENT INSTRUCTIONS
As discussed today you have a virus and do not need any antibiotics for this  Please continue plenty of clear liquids and avoid dairy and cheese  For the nasal congestion especially at night you may use over-the-counter Afrin nasal spray for 3-5 nights only  You may also use the Mucinex Sinus max as this may help alleviate your symptoms even more  Most important is rest and plenty of liquids  Infección respiratoria superior   LO QUE NECESITA SABER:   Loan infección respiratoria superior también se conoce virgie el resfriado común  Julissa es loan infección que puede afectar echeverria nariz, garganta, oídos y los senos frontales  Para personas saludables, el resfriado común generalmente no es grave y no requiere tratamiento especial  Los síntomas del resfriado generalmente son [place] graves barby los primeros 3 a 5 días  [place] [address] en 7 a 14 días  Puede que usted siga tosiendo por 2 a 3 semanas  Los resfriados son provocados por virus y no mejoran con antibióticos  INSTRUCCIONES SOBRE EL ALEIDA HOSPITALARIA:   Busque atención médica de inmediato si:   · Usted tiene dolor torácico y dificultad para respirar  Pregúntele a echeverria [place] vitaminas y minerales son adecuados para usted  · Usted tiene fiebre de más de 102ºF [facility])  · Echeverria garganta irritada empeora o usted ve manchas elizabeth o audrey en echeverria garganta  · So síntomas empeoran después de 3 a 5 días o echeverria resfriado no mejora en 14 días  · Usted tiene sarpullido en alguna parte de echeverria piel  · Usted tiene bultos grandes y sensible en echeverria magui    · Usted tiene secreción espesa de color yoni o amarillo proveniente de echeverria nariz  · Usted expectora mucosidad de color amarillo, yoni o con [place]  · Usted vomita por más de 24 horas y no puede retener líquidos en el estómago  · Usted tiene un grave dolor de oído  · Usted tiene preguntas o inquietudes acerca de echeverria condición o cuidado    Medicamentos:  Es posible que usted necesite alguno de los siguientes:  · Los descongestionantes  ayudan a reducir la congestión nasal y [place] a respirar más fácilmente  Si norm pastillas descongestionantes, pueden causarle agitación o problemas para dormir  No use aerosol descongestionante por más de unos cuantos días  · Los jarabes para la tos  ayudan a reducir la tos  Pregúntele a echeverria médico cuál tipo de medicamento para la tos es mejor para usted  · AINEs (Analgésicos antiinflamatorios no esteroides) virgie el ibuprofeno, ayudan a disminuir la inflamación, el dolor y la [place]  Los AINEs pueden causar sangrado estomacal o problemas renales en ciertas personas  Si usted norm un medicamento anticoagulante, siempre pregúntele a echeverria médico si los DINORAH son seguros para usted  Siempre keshia la etiqueta de rohan medicamento y [place] instrucciones  · Acetaminofeno:  gio el dolor y baja la fiebre  Está disponible sin receta médica  Pregunte la cantidad y la frecuencia con que debe tomarlos  [facility]  [place] las etiquetas de todos los demás medicamentos que esté usando para saber si también contienen acetaminofén, o pregunte a echeverria médico o farmacéutico  El acetaminofén puede causar daño en el hígado cuando no se norm de forma correcta  No use más de 4 gramos (4000 miligramos) en total de acetaminofeno en un día  · [place] aneta medicamentos virgie se le haya indicado  Consulte con echeverria médico si usted parish que echeverria medicamento no le está ayudando o si presenta efectos secundarios  Infórmele si es alérgico a cualquier medicamento  Mantenga karyn lista actualizada de los [place], las vitaminas y los productos herbales que norm  Incluya los siguientes datos de los medicamentos: cantidad, frecuencia y motivo de administración  Traiga con usted la lista o los envases de la píldoras a aneta citas de seguimiento  Lleve la lista de los medicamentos con usted en shashi de karyn emergencia  Acuda a aneta consultas de control con echeverria médico según le indicaron    Anote aneta preguntas para que se acuerde de hacerlas barby aneta visitas  Cuidados personales:   · Descanse el mayor tiempo posible  Empiece a hacer un poco más día a día  · [employer] líquidos virgie se le indique  Los líquidos le ayudarán a aflojar y disolver la mucosidad para que la pueda expectorar  Los líquidos ayudarán a evitar la deshidratación  Los líquidos que ayudan a prevenir la deshidratación pueden ser [place] y caldo  No tome líquidos que contienen cafeína  La cafeína puede aumentar el riesgo de deshidratación  Pregúntele a echeverria médico cuál es la cantidad de líquido que necesita ingerir a diario  · Alivie el dolor de garganta  Rohith gárgaras de agua tibia con sal  [place] ayuda a aliviar el dolor de garganta  Prepare agua salina disolviendo ¼ de cucharada de sal a 1 taza de agua tibia  Usted puede también chupar dulces duros o pastillas para la garganta  Usted puede usar aerosol para el dolor de garganta  · Use un humidificador o vaporizador  Use un humidificador de marvin frío o un vaporizador para elevar la humedad en echeverria casa  [place] podría ayudarle a respirar más fácilmente y al mismo tiempo disminuir la tos  · Use gotas nasales de solución salina virgie se le haya indicado  Estas ayudan a [place]  · Aplique vaselina en la parte externa alrededor de las fosas nasales  Esta puede disminuir la irritación de sonarse la nariz  · No fume  La nicotina y otros químicos en los cigarrillos y cigarros pueden empeorar aneta síntomas  También pueden causar infecciones virgie la bronquitis o la neumonía  Pida información a echeverria médico si usted actualmente fuma y necesita ayuda para dejar de fumar  Los cigarrillos electrónicos o tabaco sin humo todavía contienen nicotina  Consulte con echeverria médico antes de [employer]    Evite transmitir echeverria resfriado a los demás:   · Trate de mantenerse alejado de otras personas barby los primeros 2 a 3 días de echeverria resfriado cuando éste es más fácil de transmitir  · No comparta alimentos o bebidas  · No comparta toallas de mano con otros miembros de hyacinth en el hogar  · [employer] frecuentemente, especialmente después de sonarse la nariz  Simone la espalda a otras personas y cúbrase la boca y la nariz con un pañuelo desechable cuando estornuda o tose  © 2017 [address] Information is for End User's use only and may not be sold, redistributed or otherwise used for commercial purposes  All illustrations and images included in CareNotes® are the copyrighted property of [employer]  or Jeff Lebron  Esta información es sólo para uso en educación  Bermudez intención no es darle un consejo médico sobre enfermedades o tratamientos  Colsulte con bermudez Lesleigh Barbara farmacéutico antes de seguir cualquier régimen médico para saber si es seguro y efectivo para usted

## 2018-11-28 NOTE — PROGRESS NOTES
Assessment/Plan:    No problem-specific Assessment & Plan notes found for this encounter  Diagnoses and all orders for this visit:    Viral upper respiratory tract infection          Subjective:      Patient ID: Se Gardner is a 52 y o  female  Pt here for [facility] c/o nasal congestion x3 days  Pt reports associated symptoms including sore throat starting yesterday  States she has decreased appetite, however able to tolerate PO and hydrating well  Denies cough, fevers  States at work there are a few people sick, however no one at home is currently sick  Pt reports taking Sudafed congestion 8 tabs daily without relief  Discussed with patient that this is too much Sudafed and advised patient to take maximum 6 tablets daily  Additionally reports taking 200 mg naproxen TID with some relief  Discussed with patient at this time her symptoms are likely due to viral syndrome and there is no need for antibiotics  Encouraged supportive therapy, including honey for her sore throat, rest and hydration  Advised patient if symptoms last longer than 1 week to return for evaluation  Patient verbalized understanding  The following portions of the patient's history were reviewed and updated as appropriate: allergies, current medications, past family history, past medical history, past social history and past surgical history  Review of Systems   Constitutional: Negative for chills and fever  HENT: Positive for congestion, sinus pressure and sore throat  Negative for ear pain, sinus pain and sneezing  Respiratory: Negative  Negative for cough  Cardiovascular: Negative  Negative for chest pain  Gastrointestinal: Negative  Negative for abdominal pain, diarrhea, nausea and vomiting  Musculoskeletal: Negative  Negative for myalgias  Skin: Negative  Negative for rash  Neurological: Positive for headaches           Objective:      /80 (BP Location: Left arm, Patient Position: Sitting, Cuff Size: Large)   Pulse 80   Temp 97 6 °F (36 4 °C) (Oral)   Ht 5' 5 25" (1 657 m)   Wt 136 kg (299 lb 13 2 oz)   BMI 49 51 kg/m²          Physical Exam   Constitutional: She appears well-developed and well-nourished  No distress  HENT:   Head: Normocephalic and atraumatic  Right Ear: External ear normal    Left Ear: External ear normal    Nose: Mucosal edema and rhinorrhea present  Mouth/Throat: Oropharynx is clear and moist  No oropharyngeal exudate  Eyes: Conjunctivae are normal    Cardiovascular: Normal rate, regular rhythm and normal heart sounds  Exam reveals no gallop and no friction rub  No murmur heard  Pulmonary/Chest: Effort normal and breath sounds normal  No respiratory distress  She has no wheezes  She has no rales  Musculoskeletal:        Cervical back: She exhibits tenderness  She exhibits no bony tenderness  Tenderness noted in paraspinal muscles extending from occipital region of head  FROM cervical spine without bony tenderness  This is not new finding  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

## 2019-09-17 ENCOUNTER — OFFICE VISIT (OUTPATIENT)
Dept: INTERNAL MEDICINE CLINIC | Facility: CLINIC | Age: 54
End: 2019-09-17

## 2019-09-17 VITALS
HEART RATE: 80 BPM | TEMPERATURE: 97.7 F | WEIGHT: 293 LBS | HEIGHT: 66 IN | BODY MASS INDEX: 47.09 KG/M2 | DIASTOLIC BLOOD PRESSURE: 83 MMHG | SYSTOLIC BLOOD PRESSURE: 138 MMHG

## 2019-09-17 DIAGNOSIS — M54.2 NECK PAIN, BILATERAL: Chronic | ICD-10-CM

## 2019-09-17 DIAGNOSIS — E66.01 CLASS 3 SEVERE OBESITY DUE TO EXCESS CALORIES WITHOUT SERIOUS COMORBIDITY WITH BODY MASS INDEX (BMI) OF 45.0 TO 49.9 IN ADULT (HCC): Primary | ICD-10-CM

## 2019-09-17 DIAGNOSIS — Z12.31 VISIT FOR SCREENING MAMMOGRAM: ICD-10-CM

## 2019-09-17 DIAGNOSIS — K04.7 DENTAL ABSCESS: Chronic | ICD-10-CM

## 2019-09-17 DIAGNOSIS — Z12.39 SCREENING FOR BREAST CANCER: ICD-10-CM

## 2019-09-17 PROBLEM — T14.8XXA MUSCLE STRAIN: Status: RESOLVED | Noted: 2018-10-11 | Resolved: 2019-09-17

## 2019-09-17 PROBLEM — E66.813 CLASS 3 SEVERE OBESITY DUE TO EXCESS CALORIES WITHOUT SERIOUS COMORBIDITY WITH BODY MASS INDEX (BMI) OF 45.0 TO 49.9 IN ADULT (HCC): Chronic | Status: ACTIVE | Noted: 2019-09-17

## 2019-09-17 PROBLEM — J06.9 VIRAL UPPER RESPIRATORY TRACT INFECTION: Status: RESOLVED | Noted: 2018-11-28 | Resolved: 2019-09-17

## 2019-09-17 PROBLEM — M79.89 NODULE OF SOFT TISSUE: Status: RESOLVED | Noted: 2018-10-11 | Resolved: 2019-09-17

## 2019-09-17 PROCEDURE — 99213 OFFICE O/P EST LOW 20 MIN: CPT | Performed by: PHYSICIAN ASSISTANT

## 2019-09-17 RX ORDER — AMOXICILLIN 500 MG/1
500 TABLET, FILM COATED ORAL 2 TIMES DAILY
COMMUNITY
End: 2020-02-18 | Stop reason: ALTCHOICE

## 2019-09-17 RX ORDER — METHOCARBAMOL 750 MG/1
750 TABLET, FILM COATED ORAL 2 TIMES DAILY PRN
Qty: 20 TABLET | Refills: 0 | Status: SHIPPED | OUTPATIENT
Start: 2019-09-17 | End: 2020-02-18 | Stop reason: ALTCHOICE

## 2019-09-17 NOTE — PATIENT INSTRUCTIONS
As per our discussion please complete the full course of antibiotics from your dentist   Reed Goldmann confirm you have a follow-up appointment in 2 days  Script provided for your labs  We will call you with the results  As reviewed I have sent a medication to help with the spasms in her neck  Also included information on the exercises and stretches to do at home to help improve her pain and prevent recurrence  Script provided to call and schedule your mammogram     As we reviewed once you meet with the financial counselor and get approved for star wellness coverage we can then provide her immunizations as well as fit testing for colon cancer screening  Continue routine follow-up with her gyn  Also as we reviewed today additional information in Italian has been provided on healthy dietary advice for blood pressure as well as weight  Also as we reviewed we recommend regular exercise with walking at least 30 minutes a day continuously 5 days per week  Ejercicios para el magui   LO QUE NECESITA SABER:   ¿Por qué es importante hacer ejercicios para el magui? Los ejercicios para el magui ayudan a reducir el dolor de magui y al mismo tiempo lo fortalecen y mejoran echeverria movimiento  Los ejercicios para el magui también ayudan a prevenir problemas de magui a [place]  ¿Qué necesito saber sobre los ejercicios para el magui? · Rohith los ejercicios a diario  o con la frecuencia indicada por echeverria médico     · Muévase lentamente, con cuidado y suavemente  Evite movimientos rápidos o bruscos  · Póngase de pie y siéntese de la forma que echeverria médico le ha [place]  La buena postura puede llegar a reducir echeverria dolor de magui  Revise echeverria postura con frecuencia, aún cuando no esté haciendo aneta ejercicios de magui  ¿Cómo puedo hacer los ejercicios para el magui de forma ortiz? · Posición de ejercicio:  Puede sentarse o estar parado mientras realiza los ejercicios para el magui  Karla de frente   Los hombros deben estar rectos y relajados, con [facility]  · Inclinación de Hortensia Cord y [place] atrás:  Incline echeverria jacey suavemente tratando que echeverria mentón toque echeverria pecho  Echeverria médico puede incluso pedirle que empuje la parte de atrás de echeverria magui para ayudar a inclinar echeverria jacey  Levante echeverria barbilla hasta la posición inicial  Incline echeverria jacey hacia atrás lo más que pueda de [place] que quede mirando hacia el doug raso  Es posible que echeverria médico le pida que levante el mentón para así ayudar a inclinar echeverria [place] Boot atrás  Regrese echeverria jacey a la posición inicial            · Inclinaciones de jacey, de lado a lado: Incline echeverria jacey de manera que echeverria oreja quede cerca de echeverria hombro  Luego incline echeverria jacey hacia echeverria otro hombro  · Giros de jacey:  Gire echeverria jacey virgie si fuera a mirar sobre el hombro  Incline echeverria mentón hacia abajo y trate de que toque echeverria hombro  No levante echeverria hombro hasta que toque echeverria mentón  Karla de frente otra vez  Rohith lo mismo del otro lado  · Giros de jacey:  Despacio, lleve la barbilla hacia el pecho  A continuación, gire la jacey hacia la derecha  La oreja debe quedar ubicada por encima del hombro  Mantenga esta posición por 5 segundos  Gire la jacey otra vez hacia el pecho y, [place], hacia la izquierda a la misma posición  Sostenga está posición por 5 segundos  Con cuidado, gire la jacey hacia atrás en círculo en el sentido de las manecillas del [place] [address] y repita 3 veces  A continuación, mueve la jacey en la dirección contraria (en el sentido contrario de las manecillas del reloj) en círculo 3 veces  No encoja los hombros hacia arriba mientras realiza rohan ejercicio  ¿Cuándo reid comunicarme con mi médico?   · Echeverria dolor no mejora o Sherryn Linker  · Usted tiene preguntas o inquietudes acerca de echeverria afección, cuidado o programa de ejercicios  ACUERDOS SOBRE ECHEVERRIA CUIDADO:   Usted tiene el derecho de ayudar a planear echeverria cuidado   Aprenda todo lo que [employer] echeverria condición y virgie darle [address]  Discuta aneta opciones de tratamiento con aneta médicos para decidir el cuidado que usted desea recibir  Usted siempre tiene el derecho de rechazar el tratamiento  Esta información es sólo para uso en educación  Echeverria intención no es darle un consejo médico sobre enfermedades o tratamientos  Colsulte con echeverria Kelsey Kind farmacéutico antes de seguir cualquier régimen médico para saber si es seguro y efectivo para usted  © 2017 [address] Information is for End User's use only and may not be sold, redistributed or otherwise used for commercial purposes  All illustrations and images included in CareNotes® are the copyrighted property of A D A M , Inc  or Jeff Lebron  Obesidad   LO QUE NECESITA SABER:   ¿Qué es la obesidad? Obesidad es cuando echeverria índice de masa corporal [facility]) es superior a 30  Echeverria médico usará echeverria peso y estatura para medir echeverria índice de masa corporal   ¿Cuáles son los riesgos de la obesidad? La obesidad puede causar [employer] de [place], incluidas las lesiones o la discapacidad Micheal  Es posible que deba realizarse exámenes para detectar lo siguiente:  · Diabetes     · Hipertensión o colesterol altoEnfermedades del corazón     · Enfermedad cardíaca     · Enfermedades del hígado o de la vesícula biliar     · Cáncer de colon, de pecho, de próstata, de hígado o de riñón     · El apnea del sueño     · Artritis o gota  ¿Cuál es el tratamiento para la obesidad? La meta de los tratamientos es ayudarlo a bajar de peso para mejorar echeverria estado de [place]  Incluso karyn reducción mínima del índice de [employer] corporal puede reducir el riesgo de muchos problemas de [place]  Echeverria médico lo ayudará a fijarse karyn meta para bajar de [place]  · Cambios en el estilo de fletcher  son los primeros pasos para tratar la obesidad  Estos cambios incluyen olive decisiones para consumir alimentos saludables y realizar karyn actividad física con regularidad   El [address] recomendarle un programa para bajar de peso que [place] de capacitación, educación y [place]  · Medicamento  le pueden ayudar a bajar de peso cuando los Patriciabury en conjunto con Graciella Gums Etheln Perez y Heri Cheema  · Cirugía  le puede ayudar a bajar de peso si usted es muy rupali y presenta otros problemas de vignesh  Existen varios tipos de [place] para adelgazar  Pídale a echeverria médico más información  ¿Cómo puedo perder peso de manera efectiva? · Propóngase metas accesibles y Cite [place] 2  Un ejemplo de karyn meta accesible es caminar 20 minutos los 5 días de la [place]  Otro objetivo puede ser perder 5% de echeverria peso corporal     · Coméntele a aneta amigos, familiares y compañeros de trabajo sobre aneta metas  y solicite que lo apoyen  Convide a un amigo para hacer ejercicio juntos o acuda a un arturo de motivación para bajar de [place]  · Identifique los alimentos o situaciones que le pueden provocar que coma en exceso y busque formas para evitarlos  Deshágase de alimentos altos en calorías en echeverria hogar o en el trabajo  En la cocina tenga karyn canasta con frutas frescas  Si el estrés es la causa para que usted coma más encuentre formas para sobrellevar el estrés  · Lleve un diario en el que registre lo que usted come y willow  También escriba la cantidad de tiempo que pasa realizando karyn actividad física barby el día  Pésese karyn vez a la semana y anótelo en echeverria diario  ¿Qué cambios en mis hábitos de alimentación reid hacer? Usted necesitará consumir menos de 500 a 1 000 calorías al día de las que usted actualmente consume para perder [employer] 1 a 2 libras a la semana  Los siguientes cambios le ayudarán a disminuir la cantidad de las calorías que normalmente consume:  · [facility] porciones  Utilice platos pequeños que no midan más de 9 pulgadas de diámetro  Llene la mitad del plato con frutas y verduras   Utilice las tazas medidoras para racionar los alimentos hasta que usted sepa virgie se ve el tamaño de Graciella Gums porción  · Consuma 3 comidas y 1 o 2 meriendas al día  Planee aneta comidas con anterioridad  Cocine y coma en la casa todo el tiempo que le sea posible  Coma lentamente  · Consuma frutas y verduras con todas las comidas  Marcela Courts y verduras son bajas en calorías y altas en fibra lo cual lo llena  No adicione mantequilla, ni margarina, ni salsas a base de crema a las verduras  Utilice las hierbas para sazonar las verduras al vapor  · Consuma menos grasas y alimentos fritos  Consuma christal o pescado al horno o la lianne  Estas proteínas son más bajas en calorías y grasas que la carne [place]  Limite las comidas rápidas  Es mejor que utilice aderezos para la ensalada a base de aceite de [place] y vinagre en vez de aderezos en botella  · Limite la cantidad de azúcar que consume  No consuma bebidas azucaradas  Limite el consumo de alcohol  ¿Qué cambios de actividad debería hacer? La actividad física es buena para echeverria cuerpo por muchas razones  Le ayuda a quemar calorías y fortalecer aneta músculos  Donalynn Andrew y la depresión y mejora echeverria estado de ánimo  Además puede ayudarle a dormir mejor  Consulte con echeverria médico antes de empezar un régimen de ejercicios  · Realice karyn actividad física por lo menos por 30 minutos 5 días a la semana  Empiece despacio  Aparte tiempo cada día para karyn actividad física que song guerra y [employer] sea [facility]  Es mejor realizar tanto un programa de pesas virgie [place] para aumentar echeverria ritmo cardíaco, virgie caminar, montar en bicicleta o nadar  · Busque formas para ser [address]  Realice karyn actividad de jardinería y limpiar la casa  [address]  [facility] escaleras en vez de utilizar el elevador  En echeverria tiempo sheila concurra a eventos que le exijan caminar, virgie festivales y ferias al [employer]  Adicionar esta actividad física puede ayudarle a bajar y [address]  ¿Cuándo reid buscar atención inmediata?    · Usted tiene un intenso dolor de jacey, confusión o dificultad para hablar  · Usted tiene debilidad en un lado del cuerpo  · Usted tiene [place] [employer], sudoración o falta de aire  ¿Cuándo reid comunicarme con mi médico?   · Usted tiene síntomas de enfermedad de la vesícula biliar o el hígado, virgie dolor en la parte superior del abdomen  · Usted tiene dolor e incomodidad de rodillas o caderas al caminar  · Usted presenta síntomas de diabetes, virgie exceso de apetito y sed y micción frecuente  · Usted presenta síntomas de apnea de sueño, virgie roncar o tener sueño barby el día  · Usted tiene preguntas o inquietudes acerca de deleon condición o cuidado  ACUERDOS SOBRE DELEON CUIDADO:   Usted tiene el derecho de ayudar a planear deleon cuidado  Aprenda todo lo que pueda sobre deleon condición y virgie darle tratamiento  Discuta aneta opciones de tratamiento con aneta médicos para decidir el cuidado que usted desea recibir  Usted siempre tiene el derecho de rechazar el tratamiento  Esta información es sólo para uso en educación  Deleon intención no es darle un consejo médico sobre enfermedades o tratamientos  Colsulte con deleon Murlene Lusty farmacéutico antes de seguir cualquier régimen médico para saber si es seguro y efectivo para usted  © 2017 [address] Information is for End User's use only and may not be sold, redistributed or otherwise used for commercial purposes  All illustrations and images included in CareNotes® are the copyrighted property of A D A M , Inc  or Jeff Lebron  Dieta saludable para el corazón   LO QUE NECESITA SABER:   ¿Qué es karyn dieta saludable para Bj Herter? Maebelle Immanuel myrtle para el corazón es un plan alimenticio bajo en grasas totales, grasas no saludables y sodio (yara)  Maebelle Immanuel saludable para el corazón ayuda a disminuir deleon riesgo de sufrir de enfermedades del Cristela Marrow y un derrame cerebral  Limite la cantidad de grasa que consume entre un 25% a 35% del total de aneta calorías diarias   Limite el sodio por debajo de los 2,300 mg al día  ¿Qué es la grasa saludable y dónde se encuentra? Las grasas saludables ayudan a mejorar los niveles de [place]  El riesgo de karyn enfermedad cardíaca se disminuye cuando los niveles de colesterol son normales  Escoja grasas saludables virgie las siguientes:  · Las grasas no saturadas  se encuentran en alimentos virgie el frijol de soja, aceites de canola, de [place], de [place] y de [place]  Se encuentra también en la margarina suave hecha con aceite líquido vegetal      · Las grasas Omega 3  se encuentran en ciertos pescados, virgie el salmón, el atún y la [place], en las nueces y en la semilla de lizette  ¿Qué es karyn grasa perjudicial y dónde se encuentra? Las grasas "malas" pueden causar niveles perjudiciales de colesterol en echeverria ivory y aumentar el riesgo de karyn enfermedad cardíaca  Limite el consumo de grasas no saludables, virgie los siguientes:  · El colesterol  se encuentra en alimentos de origen animal, virgie los huevos y la langosta al igual que en productos lácteos hechos con leche entera  Limite el consumo de colesterol a menos de 300 milligramos (mg) al día  Es posible que necesite limitar el colesterol a 200 mg al día si sufre de karyn enfermedad cardíaca  · Las grasas saturadas  se encuentran en ortiz virgie el tocino y la hamburguesa  Estas se encuentran también en la piel del christal y del [place], [place] entera y [place]  Limite el consumo de grasas saturadas a menos del 7% del total de aneta calorías diarias  Limite las grasas saturadas a menos del 6% si usted tiene enfermedad cardíaca o tiene un mayor riesgo para esto  · La grasa trans  se encuentran en los alimentos envasados, virgie las papitas de bolsa y las [place]  También se encuentra en la margarina dura, algunos alimentos fritos y la manteca  Evite lo más que [employer] trans  ¿Qué alimentos o bebidas puedo consumir en karyn dieta saludable para el corazón?   Solicite que echeverria nutricionista o el Be Here indique cuántas porciones necesita consumir de los siguientes alimentos de cada arturo alimenticio:  · Granos:      ¨ Panes, cereales y pastas de [place] integral y María Elenatravis [place]en integral    ¨ Patatas fritas y galletas saladas bajas en grasa, bajas en sodio    · Verduras:      ¨ Brócoli, judías verdes, guisantes y espinacas    ¨ [place], col y habas    ¨ Zanahorias, patatas dulces, tomates y pimientos    ¨ Vegetales enlatados sin yara añadida    · Frutas:      ¨ Plátanos, melocotones, peras y harrell    ¨ Uvas, pasas y dátiles    ¨ [place], [place], [facility], [facility] y [facility]    ¨ Maldonado, [place], melón y papaya    ¨ [place] y fresas    ¨ Conservas de frutas sin azúcares añadidos    · Productos lácteos bajos en grasa:      ¨ Leche sin grasa (descremada), leche 1%, leche baja en grasa de [place], anacardo o leche de soja fortificada con calcio    ¨ Queso cottage, queso bajo en grasa y yogur regular o congelado    · Michele y proteínas , uribe de carne [place] de res o cerdo (chuletas, pierna, anthony), el christal y el pavo sin piel, legumbres, productos de soya, las claras del huevo y nueces o ankit secos  ¿Cuáles alimentos o bebidas necesito limitar o evitar?   Pregúntele a echeverria médico o nutricionista sobre estos y otros alimentos que contienen altos niveles de [place], azúcar y [place] que no son saludables:  · [employer] , virgie las comidas congeladas, rosa elena [place] con queso y cereales con más de 300 mg de sodio por porción    · Productos enlatados o mezclas secas  para pasteles, sopas o salsas    · Verduras con sodio agregada , virgie las kallie instantáneas, verduras con salsas agregadas o conservas regulares de verduras    · Otros alimentos altos en sodio , virgie la salsa de [place], salsa de [facility], aderezo para [place], encurtidos de [place], U[place]ER [place], salsa de soya y miso    · Alimentos lácteos con alto contenido de grasa  virgie leche entera o 2%, queso crema o crema agria y quesos     · [place] con proteínas altas en grasa  uribe de carne (virgie las [address], las chuletas con Jah), el christal o pavo sin piel y las vísceras de animal virgie el hígado    · Michele curadas o [place] , virgie las salchichas, el tocino y salchichones    · Aceites y grasas poco saludables , virgie la [place], margarina en Gerianne Boot y aceites para cocinar virgie el aceite de lani o jones    · [place] y bebidas altas en azúcar , tales virgie refrescos (gaseosas), bebidas deportivas, té azucarado, dulces, pasteles, galletas, tartas y donas  ¿Qué otras pautas para la dieta reid seguir? · Consuma más alimentos que contengan grasas Omega-3  Consuma pescado con altas cantidades de Omega-3 por lo menos 2 veces a la semana  · Limite el consumo de alcohol  Demasiado alcohol puede dañar echeverria corazón y elevar echeverria presión arterial  Las mujeres deberían limitar el consumo de alcohol a 1 bebida por día  Los hombres deberían limitar el consumo de alcohol a 2 tragos al día  Un trago equivale a 12 onzas de cerveza, 5 onzas de vino o 1 onza y ½ de licor  · Escoja alimentos bajos en sodio  Alimentos altos de sodio pueden conducir a la hipertensión  Al preparar la comida añada muy poca sal o no use sal  Use hierbas y especias en vez de la sal     · Consuma más fibra  para ayudar a bajar los niveles de [place]  Consuma al menos 5 porciones de frutas y verduras todos los días  Consuma 3 onzas de alimentos integrales al día  Sydnie Piles (fríjoles) son Emmit Pattee buena carolina de Mattie  ¿Qué pautas de estilo de fletcher debería seguir? · No fume  La nicotina y otros químicos contenidos en los cigarrillos y cigarros pueden causar daño a aneta pulmones y el corazón  Pida información a echeverria médico si usted actualmente fuma y necesita ayuda para dejar de fumar  Los cigarrillos electrónicos o tabaco sin humo todavía contienen nicotina  Consulte con echeverria médico antes de [employer]       · Rohith ejercicio regularmente  para que le ayude a mantener un peso saludable y mejorar echeverria presión arterial y niveles de [place]  Pregunte a echeverria médico acerca del mejor plan de ejercicio para usted  No empiece un programa de ejercicios sin antes consultar con echeverria médico    ACUERDOS SOBRE ECHEVERRIA CUIDADO:   Usted tiene el derecho de ayudar a planear echeverria cuidado  Discuta aneta opciones de tratamiento con aneta médicos para decidir el cuidado que usted desea recibir  Usted siempre tiene el derecho de rechazar el tratamiento  Esta información es sólo para uso en educación  Echeverria intención no es darle un consejo médico sobre enfermedades o tratamientos  Colsulte con echeverria [place] Gu[place] farmacéutico antes de seguir cualquier régimen médico para saber si es seguro y efectivo para usted  © 2017 [address]  Information is for End User's use only and may not be sold, redistributed or otherwise used for commercial purposes  All illustrations and images included in CareNotes® are the copyrighted property of A D A M , Inc  or Jeff Lebron  Plan de alimentación con "enfoque dietético para detener la hipertensión (DASH, por aneta siglas en inglés)   LO QUE NECESITA SABER:   ¿Qué es el plan de alimentación con enfoque dietético para detener la hipertensión (DASH, por aneta siglas en inglés)? El plan de alimentación DASH está diseñado para ayudar a prevenir o disminuir la hipertensión  También puede ayudar a bajar el colesterol daren (colesterol LDL) y disminuír echeverria riesgo de enfermedad cardíaca  El plan es bajo en sodio, azúcar, grasas dañinas, y grasas en echeverria totalidad  Es alto en potasio, calcio, magnesio y [place]  Estos nutrientes se agregan al consumir más frutas, vegetales y granos enteros  ¿Cuál es mi límite de sodio por día? Echeverria dietista le indicará la cantidad de sodio que usted debe consumir a diario  La gente que tiene la presión arterial sarah debe consumir de 1,500 a 2,300 mg de sodio al día virgie matias  Loan cucharadita (cdta) de sal tiene 2,300 mg de sodio   [place] puede parecer virgie karyn meta difícil, martha pequeños cambios en los alimentos que usted consume pueden hacer karyn gran diferencia  Echeverria médico o dietista puede ayudarlo a crear un plan alimenticio que cumpla echeverria límite de sodio  ¿Cómo limito mi consumo de sodio? · Keshia las etiquetas de los alimentos  Las etiquetas pueden ayudarle a escoger alimentos bajos en sodio  La cantidad de sodio está incluida en miligramos (mg)  La columna del porcentaje de valor diario indica la cantidad de necesidades diarias satisfechas con 1 porción del alimento para cada nutriente en la lista  Escoja alimentos que tengan menos de 5% del porcentaje diario de [place]  Estos alimentos se consideran bajos en sodio  Los alimentos que tienen 20% o más del porcentaje diario de sodio se consideran alimentos altos en sodio  Evite alimentos que tengan más de 300 mg de sodio por porción  Escoja alimentos Jereld Dyke diga que son bajos en sodio, con sodio reducido, o sin sal agregada  · Evite la sal   No le agregue sal a la comida cuando se siente a la piedra a comer, y agregue muy poca sal a los alimentos barby la cocción  Use hierbas y condimentos, virgie cebollas, ajo y especias sin sal para agregar sabor a los alimentos  Use jugo de lima, geovani o vinagre para darle a los alimentos un sabor ácido  Use chiles picantes o karyn cantidad pequeña de salsa picante para agregar un sabor picante a los alimentos  · Pregunte sobre los sustitutos para la sal   Pregúntele a echeverria médico si es posible usar sustitutos de la sal  Algunos sustitutos de la sal vienen con ingredientes que pueden ser dañinos si usted tiene ciertos padecimientos médicos  · Escoja los alimentos cuidadosamente cuando sale a comer a restaurantes  las comidas de los restaurantes, sobre todo restaurantes de comida rápida, cecelia siempre son altas en sodio  Algunos restaurantes ofrecen información nutricional que indica la cantidad de sodio en aneta alimentos   Pida que preparen aneta comidas con menos sal o sin sal   ¿Qué reid saber sobre las grasas? · Incluya grasas saludables  Las grasas insaturadas y los ácidos grasos omega-3 son ejemplos de grasas saludables  Las grasas insaturadas se encuentran en los aceites de soja, canola, [place] o [place] y la margarina Ricardo Rubinstein y [facility]  Los ácidos grasos [place] 3 se encuentran en el pescado con grasa, virgie el salmón, el atún, la caballa y las kelsea  También se le puede encontrar en el aceita de linaza y en la linaza molida  · Evite las grasas insalubres  No consuma grasas dañinas, virgie grasas saturadas y grasas trans  Las grasas saturadas se encuentran en alimentos que contienen grasa animal  Gearldine Johnny son [facility] grasosas, la [place], la [place], la crema y otros productos lácteos  Además se pueden encontrar en la [place], la margarina en gem, el aceite de jones y el aceite de lani  Las grasas trans se encuentran en comidas fritas, galletas estilo soda, tortillitas tostadas, y alimentos horneados hechos con Solomon [place]  ¿Qué alimentos debería incluir? Con el plan de alimentación DASH, usted necesita consumir un número específico de porciones de cada arturo de alimentos  [place] le ayudará a consumir las cantidades suficientes de ciertos nutrientes y limitar otros  La cantidad de porciones que usted debe comer depende de la cantidad de calorías que usted necesita  [place] dietista le informará sobre cuántas calorías necesita usted  El número de porciones que viene en la lista al lado de los grupos alimenticios a continuación es para las personas que necesitan aproximadamente 2,000 calorías al día    · Granos:  6 a 8 porciones (3 de estas porciones deben ser de alimentos de granos enteros o integrales)    ¨ 1 tajada de pan integral     ¨ 1 onza de cereal seco    ¨ ½ taza de cereal cocinado, pasta, o arroz integral    · Verduras y frutas:  4 a 5 porciones de frutas y 4 a 5 porciones de vegetales    ¨ 1 fruta mediana    ¨ 1 taza de vegetales crudos de hojas    ¨ ½ taza de vegetales congelados, enlatados (sin sal adicional), o vegetales frescos y picados     ¨ ½ taza de fruta fresca, congelada, seca o enlatada (enlatada en sirope liviano o jugo de fruta)    ¨ ½ taza de jugo de verduras o frutas    · Productos lácteos:  2 a 3 porciones    ¨ 1 taza de [employer] o leche 1%    ¨ 1½ onzas de queso descremado o bajo en grasas y bajo en sodio    ¨ 6 onzas de yogurt descremado o bajo en grasas    · Michele hernadez, michele elizabeth y pescado magros:  6 onzas o menos    ¨ Michele elizabeth (christal, pavo) sin piel    ¨ Pescado (sobre todo pescado con grasa, virgie salmón, atún fresco o GARG)    ¨ Carne de res o de cerdo magra (lomo, carne molida extra [place])    ¨ Claras de huevo y sustitutos del huevo    · [place] du sac, semillas y legumbres:  4 a 5 porciones por semana    ¨ ½ taza de frijoles y arbejas cocinadas    ¨ 1½ onzas de nueces sin sal    ¨ 2 cucharadas de mantequilla de maní o semillas    · Dulces y azúcares adicionales:  5 o menos por semana    ¨ 1 cucharada de azúcar, jalea o mermelada    ¨ ½ taza de sorbeto o gelatina    ¨ 1 taza de limonada    · Grasas:  2 a 3 porciones por semana    ¨ 1 cucharadita de margarina suave o aceite vegetal    ¨ 1 cucharada de [place]    ¨ 2 cucharadas de aderezo para ensaladas  ¿Qué alimentos reid evitar?    · Granos:      ¨ Postres horneados o fritos virgie donas, pastelitos, galletas, y quequitos (altos en grasas y azúcar)    ¨ Mezclas para hacer pan de maíz y pasteles, alimentos empacados, virgie rellenos para [place], mezclas para hacer arroz y pasta, macarrones con Navarro-barre, y cereales instantáneos (altos en sodio)    · Lenoria Flack y verduras:      ¨ Vegetales regulares enlatados (altos en sodio)    ¨ Repollo preparado en vinagre, vegetales en vinagre, y otros alimentos preparados en escabeche (altos en sodio)    ¨ Vegetales fritos o vegetales en mantequilla o salsas altas en grasas    ¨ Frutas en crema o salsa de [place] (altas en grasas)    · Productos lácteos:      ¨ Leche entera, leche semi descremada (2%), y crema (sarah en grasas)    ¨ Queso regular y [place] procesado (alto en grasa y [place])    · Michele y alimentos con proteínas:      ¨ Michele ahumadas o curadas, virgie carne de cm en conserva, tocino, jamón, perros calientes, y salchicha (sarah en grasas y sodio)    ¨ Frijoles enlatados y michele enlatadas o michele en pasta, virgie michele en conserva, kelsae, anchoas y [address] de imitación (altos en sodio)    ¨ Michele para emparedados, virgie [place], [place], [place], y carne de res en rebanada (altos en sodio)    ¨ Michele altas en grasas (bistec estilo T-bone, carne molida para hamburguesas, costillas)    ¨ Huevos enteros y yemas de huevo (altos en grasas)    · Otros:      ¨ Condimentos hechos con sal, virgie sal de ajo, sal de apio, sal de cebolla, sal condimentada, suavizantes para michele, y glutamato de monosodio (MSG, por aneta siglas en inglés)    ¨ Sopa Miso y mezclas para sopa enlatadas o secas (altas en sodio)    ¨ Salsa de soya regular, salsa de [address], salsa [place], salsa para bistec, [employer], y la mayoría de las vinagres con sabor (altas en sodio)    ¨ Condimentos regulares, virgie mostaza, salsa de tomate y aderezos para ensalada (altos en sodio)    ¨ Salsa para michele y salsas en general, virgie salsa [place] o de queso (altas en sodio y [place])    ¨ Bebidas altas en azúcar, virgie bebidas gaseosas o jugos de frutas    ¨ Alimentos para merendar, virgie kallie tostadas, palomitas de [place], pretzels, piel de cerdo, galletas de soda [place], y nueces saladas    ¨ Alimentos congelados, virgie cenas preparadas, platos principales, vegetales con salsas, y michele cubiertas en pan (altas en sodio)  ¿Qué otras pautas reid seguir? · Mantenga un peso saludable  Bermudez riesgo de enfermedad cardíaca es aún más alto si usted tiene sobrepeso  Bermudez médico podría sugerirle que adelgace si tiene sobrepeso   Usted puede perder peso si se propone consumir menos calorías y alimentos que tengan azúcar y grasas agregadas  El plan de alimentación DASH puede ayudarle a lograrlo  Loan buena forma de disminuir el consumo de calorías es consumiendo porciones más pequeñas en cada comida y menos meriendas entre comidas  Consulte a deleon médico para obtener más información sobre cómo adelgazar  · Realice actividad física con regularidad  El ejercicio regular puede ayudarle a alcanzar o mantener un peso saludable  El ejercicio regular también puede ayudarle a disminuir deleon presión arterial y mejorar aneta niveles de colesterol  Rohith ejercicios moderados por 30 minutos o más todos los días de la [place]  Para bajar peso, asegúrese de ejercitarse por lo menos 60 minutos  Consulte con deleon médico sobre un programa de ejercicio adecuado para usted  · Limite el consumo de alcohol  Las mujeres deberían limitar el consumo de alcohol a 1 bebida por día  Los hombres deberían limitar el consumo de alcohol a 2 tragos al día  Un trago equivale a 12 onzas de cerveza, 5 onzas de vino o 1 onza y ½ de licor  ¿Dónde puedo obtener más información? · [facility] [address]  [address] [address]  [place]aila [place] [address]  Phone: [phone]  Web Address: ItCheaper no  ACUERDOS [facility] DELEON CUIDADO:   Roseline Amaya tiene el derecho de ayudar a planear deleon cuidado  Discuta aneta opciones de tratamiento con aneta médicos para decidir el cuidado que usted desea recibir  Usted siempre tiene el derecho de rechazar el tratamiento  Esta información es sólo para uso en educación  Deleon intención no es darle un consejo médico sobre enfermedades o tratamientos  Colsulte con deleon Dewain Racer farmacéutico antes de seguir cualquier régimen médico para saber si es seguro y efectivo para usted    © 2017 [address] Information is for End User's use only and may not be sold, redistributed or otherwise used for commercial purposes  All illustrations and images included in CareNotes® are the copyrighted property of A D A M , Inc  or Jeff Lebron

## 2019-09-17 NOTE — PROGRESS NOTES
Assessment/Plan:    As per our discussion please complete the full course of antibiotics from your dentist   Lourdes Dong confirm you have a follow-up appointment in 2 days  Script provided for your labs  We will call you with the results  As reviewed I have sent a medication to help with the spasms in her neck  Also included information on the exercises and stretches to do at home to help improve her pain and prevent recurrence  Script provided to call and schedule your mammogram     As we reviewed once you meet with the financial counselor and get approved for [place] wellness coverage we can then provide her immunizations as well as fit testing for colon cancer screening  Continue routine follow-up with her gyn  Also as we reviewed today additional information in Estonian has been provided on healthy dietary advice for blood pressure as well as weight  Also as we reviewed we recommend regular exercise with walking at least 30 minutes a day continuously 5 days per week  No problem-specific Assessment & Plan notes found for this encounter  Diagnoses and all orders for this visit:    Class 3 severe obesity due to excess calories without serious comorbidity with body mass index (BMI) of 45 0 to 49 9 in [facility])  -     Comprehensive metabolic panel  -     Lipid Panel with Direct LDL reflex  -     TSH, 3rd generation with Free T4 reflex    Neck pain, bilateral  -     methocarbamol (ROBAXIN) 750 mg tablet; Take 1 tablet (750 mg total) by mouth 2 (two) times a day as needed for muscle spasms for up to 10 days    Dental abscess    Screening for breast cancer  -     Mammo screening bilateral w cad; Future    Visit for screening mammogram    Other orders  -     amoxicillin (AMOXIL) 500 MG tablet; Take 500 mg by mouth 2 (two) times a day          Subjective:      Patient ID: Edmond Storey is a 62 y o  female      Here for routine    Was at dentist yesterday and diagnosed with dental infection and was just started on amoxicillin 3x a day yesterday  Has follow up scheduled 9/19  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  So has been drinking a lot of coffee  After sitting blood pressure rechecked and was 138/83  Patient advised she needs to limit caffeine as well as salty foods  Also reviewed that increasing weight is also playing a role in her blood pressure and general health  Reviewed with patient that we will be providing additional information in [place] to improve her diet and while patient does do physical work in a warehouse it is repetitive movements and she is not getting regular exercise  Encouraged patient to walk on a regular basis  We also reviewed that her working night shift is also affecting as she reports is not sleeping as well  Had to cancel appt with financial counselor due to infection will reschedule so can get start wellness  Patient also reporting that she is having some right greater than left neck pain  She does not have this pain at rest only with movement  As noted she is now working 12 hour shifts in a warehouse with a lot of reaching and repetitive movements  Pain does not radiate into either upper extremity  Patient is also overdue for colonoscopy  Patient has declined secondary to cost   Once patient has [facility] we can provide her with fit testing for at home  The following portions of the patient's history were reviewed and updated as appropriate: allergies, current medications, past family history, past medical history, past social history, past surgical history and problem list     Review of Systems   Constitutional: Positive for fatigue  Negative for activity change, appetite change, chills and fever  HENT: Positive for dental problem  Negative for mouth sores and trouble swallowing  Eyes: Negative  Negative for visual disturbance  Respiratory: Negative  Negative for cough and shortness of breath      Cardiovascular: Negative  Negative for chest pain, palpitations and leg swelling  Gastrointestinal: Negative  Negative for abdominal pain, constipation and vomiting  Endocrine: Negative  Negative for polydipsia, polyphagia and polyuria  Genitourinary: Negative  Negative for dysuria and frequency  Musculoskeletal: Positive for myalgias and neck pain  Negative for joint swelling  Skin: Negative  Negative for rash  Neurological: Negative for dizziness, tremors, weakness, light-headedness and headaches  Psychiatric/Behavioral: Negative  Objective:      /83   Pulse 80   Temp 97 7 °F (36 5 °C) (Oral)   Ht 5' 5 5" (1 664 m)   Wt 133 kg (293 lb 10 4 oz)   BMI 48 12 kg/m²          Physical Exam   Constitutional: She is oriented to person, place, and time  She appears well-developed and well-nourished  HENT:   Head: Normocephalic and atraumatic  Mouth/Throat: Oropharynx is clear and moist    Fair to poor dentition  Broken upper right eye tooth  Some swelling around gum line  As noted patient just started antibiotics yesterday  Eyes: Conjunctivae are normal    Neck: Normal range of motion  Neck supple  No thyromegaly present  Cardiovascular: Normal rate, regular rhythm and normal heart sounds  No murmur heard  Pulmonary/Chest: Effort normal and breath sounds normal  She has no wheezes  Abdominal: Soft  Bowel sounds are normal  There is no tenderness  Musculoskeletal: She exhibits tenderness  She exhibits no edema or deformity  Neurological: She is alert and oriented to person, place, and time  She displays normal reflexes  No cranial nerve deficit or sensory deficit  Skin: Skin is warm and dry  Psychiatric: She has a normal mood and affect  Her behavior is normal    Nursing note and vitals reviewed        PHQ-9 Depression Screening    PHQ-9:    Frequency of the following problems over the past two weeks:       Little interest or pleasure in doing things:  0 - not at all  Feeling down, depressed, or hopeless:  0 - not at all  PHQ-2 Score:  0       BMI Counseling: Body mass index is 48 12 kg/m²  The BMI is above normal  Nutrition recommendations include reducing portion sizes, decreasing overall calorie intake, 3-5 servings of fruits/vegetables daily, decreasing soda and/or juice intake, moderation in carbohydrate intake, reducing intake of saturated fat and trans fat and reducing intake of cholesterol  Exercise recommendations include exercising 3-5 times per week

## 2020-02-18 ENCOUNTER — OFFICE VISIT (OUTPATIENT)
Dept: INTERNAL MEDICINE CLINIC | Facility: CLINIC | Age: 55
End: 2020-02-18

## 2020-02-18 VITALS
TEMPERATURE: 98 F | DIASTOLIC BLOOD PRESSURE: 92 MMHG | BODY MASS INDEX: 46.8 KG/M2 | OXYGEN SATURATION: 95 % | WEIGHT: 280.87 LBS | HEIGHT: 65 IN | HEART RATE: 66 BPM | SYSTOLIC BLOOD PRESSURE: 150 MMHG

## 2020-02-18 DIAGNOSIS — Z12.11 COLON CANCER SCREENING: ICD-10-CM

## 2020-02-18 DIAGNOSIS — R10.2 PERINEAL PAIN IN FEMALE: ICD-10-CM

## 2020-02-18 DIAGNOSIS — N84.0 ENDOMETRIAL POLYP: Primary | ICD-10-CM

## 2020-02-18 DIAGNOSIS — Z11.3 ROUTINE SCREENING FOR STI (SEXUALLY TRANSMITTED INFECTION): ICD-10-CM

## 2020-02-18 DIAGNOSIS — Z12.11 SCREENING FOR COLON CANCER: ICD-10-CM

## 2020-02-18 DIAGNOSIS — R03.0 ELEVATED BLOOD PRESSURE READING: ICD-10-CM

## 2020-02-18 PROCEDURE — 3008F BODY MASS INDEX DOCD: CPT | Performed by: PHYSICIAN ASSISTANT

## 2020-02-18 PROCEDURE — 99213 OFFICE O/P EST LOW 20 MIN: CPT | Performed by: PHYSICIAN ASSISTANT

## 2020-02-18 PROCEDURE — 1036F TOBACCO NON-USER: CPT | Performed by: PHYSICIAN ASSISTANT

## 2020-02-18 NOTE — PATIENT INSTRUCTIONS
As we reviewed today there is no clear cause for the discomfort you have described that has happened twice in the past 1 year  It does appear to be self-limiting with no additional signs associated with it  You are due however to follow-up with her OBGYN as you were to discuss possible surgery for removal of endometrial/uterine polyp  Information provided today so you may schedule that follow-up appointment  If however your symptoms return or any new symptoms please contact our office or the OBGYN for earlier evaluation  We also reviewed that I have reordered your labs that were ordered at last visit in 2019  Please get these completed at your earliest convenience and we will contact you with those results  We also discussed your blood pressure was elevated today but you do admit to having a lot of coffee and also high salt foods  We reviewed the importance of decreasing caffeine and limiting salty foods  Additional information for heart healthy diet provided to you today in [place]  Mammogram is scheduled for March 2020  We are also providing you with the fit kit which is part of your screening tests for colon cancer  Dieta saludable para el corazón   CUIDADO AMBULATORIO:   Kiran Dougherty saludayogesh para el corazón  es un plan alimenticio que contiene bajos niveles de grasa total, grasas perjudiciales y de sodio (yara)  Kiran Dougherty saludayogesh para el corazón ayuda a disminuir echeverria riesgo de sufrir de enfermedades del Marie Service y un derrame cerebral  Limite la cantidad de grasa que consume entre un 25% a 35% del total de aneta calorías diarias  Limite el sodio por debajo de los 2,300 mg al día  Grasas saludables:  Las grasas saludables ayudan a mejorar los niveles de [place]  El riesgo de karyn enfermedad cardíaca se disminuye cuando los niveles de colesterol son normales   Escoja grasas saludables virgie las siguientes:  · Las grasas no saturadas  se encuentran en alimentos virgie el frijol de soja, aceites de canola, de [place], de maíz y de [place]thmichelle  Se encuentra también en la margarina suave hecha con aceite líquido vegetal      · Las grasas Omega 3  se encuentran en ciertos pescados, virgie el salmón, el atún y la [place], en las nueces y en la semilla de lizette  Grasas no saludables:  Las grasas "malas" pueden causar niveles perjudiciales de colesterol en echeverria ivory y aumentar el riesgo de karyn enfermedad cardíaca  Limite el consumo de grasas no saludables, virgie los siguientes:  · El colesterol  se encuentra en alimentos de origen animal, virgie los huevos y la langosta al igual que en productos lácteos hechos con leche entera  Limite el consumo de colesterol a menos de 300 milligramos (mg) al día  Es posible que necesite limitar el colesterol a 200 mg al día si sufre de karyn enfermedad cardíaca  · Las grasas saturadas  se encuentran en ortiz virgie el tocino y la hamburguesa  Estas se encuentran también en la piel del christal y del [place], [place] entera y [place]  Limite el consumo de grasas saturadas a menos del 7% del total de aneta calorías diarias  Limite las grasas saturadas a menos del 6% si usted tiene enfermedad cardíaca o tiene un mayor riesgo para esto  · La grasa trans  se encuentran en los alimentos envasados, virgie las papitas de bolsa y las [place]  También se encuentra en la margarina dura, algunos alimentos fritos y la manteca  Evite lo más que [employer] trans    Alimentos y bebidas saludables para el corazón para incluir:  Solicite que echeverria nutricionista o el médico le indique cuántas porciones necesita consumir de los siguientes alimentos de cada arturo alimenticio:  · Granos:      ¨ Panes, cereales y pastas de harina integral y Floyde Beau integral    ¨ Patatas fritas y galletas saladas bajas en grasa, bajas en sodio    · Verduras:      ¨ Brócoli, judías verdes, guisantes y espinacas    ¨ [place], col y habas    ¨ Zanahorias, patatas dulces, tomates y pimientos    ¨ Vegetales enlatados sin yara Bevely Citizen    · Frutas:      ¨ Plátanos, melocotones, peras y harrell    [address], pasas y [place]    ¨ Prem, [place], [place], [facility] de [facility] y [facility]    ¨ Maldonado, [place], melón y papaya    ¨ [place] y fres    ¨ Conservas de frutas sin azúcares añadidos    · Productos lácteos bajos en grasa:      ¨ Leche sin grasa (descremada), leche 1%, leche baja en grasa de [place], anacardo o leche de soja fortificada con calcio    ¨ Queso cottage, queso bajo en grasa y yogur regular o congelado    · Rebeccaside y proteínas , uribe de carne [place] de [facility] o cerdo (chuletas, pierna, anthony), el christal y el pavo sin piel, legumbres, productos de soya, las claras del huevo y nueces o ankit secos    Alimentos y bebidas que debe limitar o evitar:  Pregúntele a echeverria médico o nutricionista sobre estos y otros alimentos que contienen altos niveles de isreal [place] y [place] que no son saludables:  · [employer] , virgie las comidas congeladas, [address] [facility], [facility] con queso y cereales con más de 300 mg de sodio por porción    · Productos enlatados o mezclas secas  para pasteles, sopas o salsas    · Verduras con sodio agregada , virgie las kallie instantáneas, verduras con salsas agregadas o conservas regulares de verduras    · Otros alimentos altos en sodio , virgie la salsa de [place], salsa de [facility], aderezo para [place], encurtidos de [place], UPPER STONE, salsa de soya y miso    · Alimentos lácteos con alto contenido de grasa  virgie leche entera o 2%, queso crema o crema agria y quesos     · Alimentos con proteínas altas en grasa  uribe de carne ([address], las chuletas con hueso), el christal o pavo sin piel y las vísceras de animal virgie el hígado    · Michele curadas o [place] , virgie las salchichas, el tocino y salchichones    · Aceites y grasas poco saludables , virgie la [place], margarina en Laretta Slovan y aceites para cocinar virgie el aceite de lani o jones    · Alimentos y bebidas altas en azúcar , tales virgie refrescos (gaseosas), bebidas deportivas, té azucarado, dulces, pasteles, galletas, tartas y donas  Otras pautas dietéticas que debe seguir:   · Consuma más alimentos que contengan grasas Omega-3  Consuma pescado con altas cantidades de Omega-3 por lo menos 2 veces a la semana  · Limite el consumo de alcohol  Demasiado alcohol puede dañar echeverria corazón y elevar echeverria presión arterial  Las mujeres deberían limitar el consumo de alcohol a 1 bebida por día  Los hombres deberían limitar el consumo de alcohol a 2 tragos al día  Un trago equivale a 12 onzas de cerveza, 5 onzas de vino o 1 onza y ½ de licor  · Escoja alimentos bajos en sodio  Alimentos altos de sodio pueden conducir a la hipertensión  Al preparar la comida añada muy poca sal o no use sal  Use hierbas y especias en vez de la sal     · Consuma más fibra  para ayudar a bajar los niveles de [place]  Consuma al menos 5 porciones de frutas y verduras todos los días  Consuma 3 onzas de alimentos integrales al día  Maria Victoriaderek De Jesus (fríjoles) son Bonita Sudeep figueroa de [place]  Pautas de estilo de fletcher:   · No fume  La nicotina y otros químicos contenidos en los cigarrillos y cigarros pueden causar daño a aneta pulmones y el corazón  Pida información a echeverria médico si usted actualmente fuma y necesita ayuda para dejar de fumar  Los cigarrillos electrónicos o tabaco sin humo todavía contienen nicotina  Consulte con echeverria médico antes de [employer]  · Heddie Chapel regularmente  para que le ayude a mantener un peso saludable y mejorar echeverria presión arterial y niveles de colesterol  Pregunte a echeverria médico acerca del mejor plan de ejercicio para usted  No empiece un programa de ejercicios sin antes consultar con echeverria Adan Solan a aneta consultas de control con echeverria médico según le indicaron  Anote aneta preguntas para que se acuerde de hacerlas barby aneta visitas     © 2017 26[address]ower Information is for End User's use only and may not be sold, redistributed or otherwise used for commercial purposes  All illustrations and images included in CareNotes® are the copyrighted property of A D A M , Inc  or Jeff Lebron  Esta información es sólo para uso en educación  Echeverria intención no es darle un consejo médico sobre enfermedades o tratamientos  Colsulte con echeverria Orlena Barban farmacéutico antes de seguir cualquier régimen médico para saber si es seguro y efectivo para usted

## 2020-02-18 NOTE — PROGRESS NOTES
Assessment/Plan:  As we reviewed today there is no clear cause for the discomfort you have described that has happened twice in the past 1 year  It does appear to be self-limiting with no additional signs associated with it  You are due however to follow-up with her OBGYN as you were to discuss possible surgery for removal of endometrial/uterine polyp  Information provided today so you may schedule that follow-up appointment  If however your symptoms return or any new symptoms please contact our office or the OBGYN for earlier evaluation  We also reviewed that I have reordered your labs that were ordered at last visit in 2019  Please get these completed at your earliest convenience and we will contact you with those results  We also discussed your blood pressure was elevated today but you do admit to having a lot of coffee and also high salt foods  We reviewed the importance of decreasing caffeine and limiting salty foods  Additional information for heart healthy diet provided to you today in Frisian  Mammogram is scheduled for March 2020  We are also providing you with the fit kit which is part of your screening tests for colon cancer  No problem-specific Assessment & Plan notes found for this encounter  Diagnoses and all orders for this visit:    Endometrial polyp  -     Ambulatory referral to Obstetrics / Gynecology; Future    Perineal pain in female    Elevated blood pressure reading    Routine screening for STI (sexually transmitted infection)  -     Cancel: HIV 1/2 AG-AB combo; Future  -     HIV 1/2 AG-AB combo; Future    Colon cancer screening  -     Cancel: HIV 1/2 AG-AB combo; Future    Screening for colon cancer  -     Occult Blood, Fecal Immunochemical; Future          Subjective:      Patient ID: Hebert Flowers is a 60 y o  female  Patient here for same-day medical home      Patient reports on 2 occasions in the past 1 year she had a sensation of a pressure that was between her rectum and her vagina  Patient denied any severe pain patient denies any itching  Patient reports she had no urinary symptoms including burning frequency or pressure  Patient denied any symptoms of constipation or diarrhea at the same time  Patient cannot recall how long ago the 1st episode was but she reports she had the same symptom on Sunday February 16th  She reports she felt the pressure more when she was sitting than standing  She otherwise felt well with no fevers chills or other symptoms  Patient denies any blood in stool urine or vaginal bleeding  Patient thought it was her polyp  Reviewed with patient that when she last saw her OBGYN in 2018 she was advised she had an endometrial polyp that she would not feel and that is inside not on the outside where she felt the pressure  Patient states does not have the same pain yesterday or today only felt it a little bit on Sunday  Also review with patient that she did not complete the labs that were ordered at last visit in September, these would be reordered today  Reviewed with patient that her last visit with gyn she was to schedule a follow-up to discuss possible surgery and hysteroscopy for removal of the endometrial polyp  Patient still feels tired but as noted patient is working 2 jobs to [place] herself and her mother and states she is only getting may be 4 hours of sleep during the daytime  Also note on today's visit patient's blood pressure is significantly elevated  Previous visits show normal blood pressure  Patient however admits she had at least 3 or 4 cups of coffee today and admits to eating foods high in salt  The following portions of the patient's history were reviewed and updated as appropriate: allergies, current medications, past family history, past medical history, past social history, past surgical history and problem list     Review of Systems   Constitutional: Negative    Negative for chills and fever  HENT: Negative  Respiratory: Negative  Cardiovascular: Negative  Gastrointestinal: Negative  Negative for abdominal pain, blood in stool, constipation, diarrhea and nausea  Endocrine: Positive for cold intolerance  Genitourinary: Positive for pelvic pain (none today as noted in HPI)  Negative for dysuria, frequency, vaginal bleeding and vaginal discharge  Based on patient's description she had pressure that was located between her rectum and vagina but did not have symptoms of either system at the same time  Patient denies any hemorrhoids no burning no bulge in the area  Skin: Negative  Negative for rash  Psychiatric/Behavioral: Negative  Objective:      /92 (BP Location: Right arm, Patient Position: Sitting, Cuff Size: Large)   Pulse 66   Temp 98 °F (36 7 °C) (Oral)   Ht 5' 5 25" (1 657 m)   Wt 127 kg (280 lb 13 9 oz)   SpO2 95%   BMI 46 38 kg/m²          Physical Exam   Constitutional: She appears well-nourished  No distress  HENT:   Head: Atraumatic  Mouth/Throat: Oropharynx is clear and moist    Neck: Neck supple  Cardiovascular: Normal rate, regular rhythm and normal heart sounds  Pulmonary/Chest: Effort normal and breath sounds normal  She has no wheezes  Abdominal: Soft  Bowel sounds are normal    Musculoskeletal: She exhibits no edema  Neurological: She is alert  Skin: Skin is warm  No rash noted  Psychiatric: She has a normal mood and affect  Nursing note and vitals reviewed

## 2020-03-03 ENCOUNTER — HOSPITAL ENCOUNTER (OUTPATIENT)
Dept: MAMMOGRAPHY | Facility: CLINIC | Age: 55
Discharge: HOME | End: 2020-03-03
Payer: COMMERCIAL

## 2020-03-03 VITALS — WEIGHT: 280 LBS | BODY MASS INDEX: 46.65 KG/M2 | HEIGHT: 65 IN

## 2020-03-03 DIAGNOSIS — Z12.39 SCREENING FOR BREAST CANCER: ICD-10-CM

## 2020-03-03 PROCEDURE — 77063 BREAST TOMOSYNTHESIS BI: CPT

## 2020-03-03 PROCEDURE — 77067 SCR MAMMO BI INCL CAD: CPT

## 2020-03-12 ENCOUNTER — OFFICE VISIT (OUTPATIENT)
Dept: OBGYN CLINIC | Facility: CLINIC | Age: 55
End: 2020-03-12

## 2020-03-12 VITALS
HEART RATE: 82 BPM | WEIGHT: 279 LBS | DIASTOLIC BLOOD PRESSURE: 87 MMHG | SYSTOLIC BLOOD PRESSURE: 140 MMHG | BODY MASS INDEX: 46.43 KG/M2

## 2020-03-12 DIAGNOSIS — N84.0 ENDOMETRIAL POLYP: ICD-10-CM

## 2020-03-12 NOTE — ASSESSMENT & PLAN NOTE
Will give script for transvaginal US today to evaluate polyp  If transvaginal US is not satisfactory, consider SIS  Patient to make appointment to discuss surgery after ultrasound

## 2020-03-12 NOTE — PROGRESS NOTES
Delphine Guerrero  60 y o   20    CC: surgical consult for endometrial polyp  Follow up with pelvic transvaginal ultrasound  Problem List Items Addressed This Visit        Genitourinary    Endometrial polyp     Will give script for transvaginal US today to evaluate polyp  If transvaginal US is not satisfactory, consider SIS  Patient to make appointment to discuss surgery after ultrasound  Relevant Orders    US abdomen and pelvis with transvaginal         Subjective: Patient is a healthy 61 yo  who presents for follow up for an endometrial polyp  All history was obtained from  phone as patient only speaks [facility] [place]  Patient notes that in 2018 an endometrial polyp was discovered via ultrasound  Biopsy revealed "inactive endometrium with ciliated cell metaplasia and scant benign endocervical tissue " Patient notes that she felt that prior physicians did not take her seriously about this  She was told in 2018 to follow up in 2 years  Review of Systems   Constitutional: Negative for chills, fever and unexpected weight change  Respiratory: Negative for shortness of breath  Cardiovascular: Negative for chest pain  Gastrointestinal: Negative for abdominal pain, nausea and vomiting  Genitourinary: Negative for difficulty urinating, dysuria, vaginal bleeding and vaginal discharge  Objective:    Vitals:    20 1310   BP: 140/87   Pulse: 82     Physical Exam   Constitutional: She appears well-developed and well-nourished  HENT:   Head: Normocephalic and atraumatic  Cardiovascular: Normal rate, regular rhythm and normal heart sounds  Pulmonary/Chest: Effort normal and breath sounds normal  No respiratory distress  Abdominal: Soft  Bowel sounds are normal    Psychiatric: She has a normal mood and affect  Her behavior is normal  Thought content normal              Some portions of this record may have been generated with voice recognition software   There may be translation, syntax, or grammatical errors  Occasional wrong word or "sound-a-like" substitutions may have occurred due to the inherent limitations of the voice recognition software       [address] [facility]   PGY1

## 2020-06-26 ENCOUNTER — HOSPITAL ENCOUNTER (OUTPATIENT)
Dept: ULTRASOUND IMAGING | Facility: HOSPITAL | Age: 55
Discharge: HOME | End: 2020-06-26
Payer: COMMERCIAL

## 2020-06-26 ENCOUNTER — TRANSCRIBE ORDERS (OUTPATIENT)
Dept: ADMINISTRATIVE | Facility: HOSPITAL | Age: 55
End: 2020-06-26

## 2020-06-26 DIAGNOSIS — N84.0 ENDOMETRIAL POLYP: ICD-10-CM

## 2020-06-26 PROCEDURE — 76856 US EXAM PELVIC COMPLETE: CPT

## 2020-06-26 PROCEDURE — 76830 TRANSVAGINAL US NON-OB: CPT

## 2020-06-30 ENCOUNTER — TELEPHONE (OUTPATIENT)
Dept: INTERNAL MEDICINE CLINIC | Facility: CLINIC | Age: 55
End: 2020-06-30

## 2020-06-30 NOTE — TELEPHONE ENCOUNTER
Chelita Dickson from [facility] radiology calling to inform PCP about significant findings on Pelvis US

## 2020-06-30 NOTE — TELEPHONE ENCOUNTER
Hi Dr Ruby Ends, I am covering for jim shepherd this week as she is this pt PCP  Radiology called our office about significant findings on this patient's pelvic US but it appears she already saw your GYN resident for this who ordered this test  I just wanted to reach out to make sure you saw it and pt has appropriate follow up  Thanks!     Chikis Zunigar JANET

## 2020-07-23 ENCOUNTER — OFFICE VISIT (OUTPATIENT)
Dept: OBGYN CLINIC | Facility: CLINIC | Age: 55
End: 2020-07-23

## 2020-07-23 VITALS
HEART RATE: 76 BPM | SYSTOLIC BLOOD PRESSURE: 157 MMHG | WEIGHT: 287 LBS | TEMPERATURE: 98.4 F | BODY MASS INDEX: 47.76 KG/M2 | DIASTOLIC BLOOD PRESSURE: 86 MMHG | RESPIRATION RATE: 16 BRPM

## 2020-07-23 DIAGNOSIS — N84.0 ENDOMETRIAL POLYP: ICD-10-CM

## 2020-07-23 PROCEDURE — 58100 BIOPSY OF UTERUS LINING: CPT | Performed by: OBSTETRICS & GYNECOLOGY

## 2020-07-23 PROCEDURE — 88305 TISSUE EXAM BY PATHOLOGIST: CPT | Performed by: PATHOLOGY

## 2020-07-23 NOTE — PROGRESS NOTES
ASSESMENT & PLAN:           1  Encounter for US results / EMBx   - Pt last saw OB/GYN 2y ago   - Was recommended to have hysteroscopy D&C for polypectomy, noted incidentally on TVUS in 2018  (of note a left hydrosalpinx was noted but had resolved with a 3mo follow up US that year)  - TVUS was originally ordered in 2018 for pelvic pain that demonstrated the above findings   - Today, the patient notes that she has no pelvic pain or issues with PMB but was encouraged to come to the OB/GYN by her primary doctor   - Of note, the patient's age and BMI of 52 (previously 46) put her at increased risk of a malignancy   - We reviewed the plan today to not only make this a results visit, but to perform a biopsy and discuss the possibility (pending results) for hysteroscopy D&C if insufficient tissue is sampled, GYN/ONC referall if a malignancy is identified, or simply surveillance if adequate and normal tissue is resulted as the patient is currently asymptomatic  I did review my concerns for possible malignancy with Stanton Ness today and she is aware to immediately contact us with any abnormal bleeding excluding the window within the time the biopsy was performed today  Endometrial biopsy  Date/Time: 7/23/2020 5:02 PM  Performed by: Nimesh Grullon DO  Authorized by: Nimesh Grullon DO     Consent:     Consent obtained:  Verbal and written    Consent given by:  Patient    Procedural risks discussed:  Bleeding, death, failure rate, infection, possible loss of function and repeat procedure    Patient questions answered: yes      Instructions and paperwork completed: yes    Indication:     Indications:  Other disorder of menstruation and other abnormal bleeding from female genital tract    Procedure:     Procedure: endometrial biopsy with Pipelle      A bivalve speculum was placed in the vagina: yes      Cervix cleaned and prepped: yes      The cervix was dilated: no      Uterus sounded: yes      Uterus sound depth (cm):  9    Curettes used:  3    Patient tolerated procedure well with no complications: yes    Findings:     Uterus size:  Non-gravid    Cervix: normal      Adnexa: normal    Comments:      Very small amount of tissue obtained, 3 passes of the pipelle were made and the patient by the end of the 3rd pass did not tolerate any further attempts, but on review of the specimen enough tissue was thought to have been sampled  I did review that if this is insufficient that my recommendation would be for hysteroscopy D&C for diagnosis and treatment    Pt seen with Dr Gavin Hunt present      SUBJECTIVE:             Elisa Berry is a 60 y o  at [id number] who presents with no complaints, here to discuss US results  Of note she has been post-menopausal and last had her period 2y ago      Review of Systems   Review of Systems  As above    OB Hx:  OB History    Para Term  AB Living   8 8 8 0 0 0   SAB TAB Ectopic Multiple Live Births   0 0 0 0 0      # Outcome Date GA Lbr Raghavendra/2nd Weight Sex Delivery Anes PTL Lv   8 Term            7 Term            6 Term            5 Term            4 Term            3 Term            2 Term            1 Term              Medical Hx  Past Medical History:   Diagnosis Date    Breast lump     last assessed: 2014    Hemorrhoid     History of varicose veins     last assessed: 2017    Irritable bowel syndrome     last assessed: 2012     (spontaneous vaginal delivery)     last assessed: 2016     Surgical Hx  Past Surgical History:   Procedure Laterality Date    APPENDECTOMY      last assessed: 10/21/2014    GALLBLADDER SURGERY      last assessed: 10/21/2014    HERNIA REPAIR      TUBAL LIGATION      last assessed: 2016     Family Hx  Family History   Problem Relation Age of Onset    Kidney disease Mother     Heart attack Father     No Known Problems Sister     No Known Problems Brother     No Known Problems Son     No Known Problems Daughter     No Known Problems Son     No Known Problems Son     No Known Problems Son     No Known Problems Son     No Known Problems Daughter     No Known Problems Daughter      Social Hx  Social History     Socioeconomic History    Marital status:      Spouse name: Not on file    Number of children: Not on file    Years of education: Not on file    Highest education level: Not on file   Occupational History    Not on file   Social Needs    Financial resource strain: Not on file    Food insecurity:     Worry: Not on file     Inability: Not on file    Transportation needs:     Medical: Not on file     Non-medical: Not on file   Tobacco Use    Smoking status: Never Smoker    Smokeless tobacco: Never Used   Substance and Sexual Activity    Alcohol use: Never     Frequency: Never     Binge frequency: Never    Drug use: Never    Sexual activity: Not Currently     Partners: Male     Birth control/protection: Female Sterilization     Comment: tubal ligation surgery   Lifestyle    Physical activity:     Days per week: Not on file     Minutes per session: Not on file    Stress: Not on file   Relationships    Social connections:     Talks on phone: Not on file     Gets together: Not on file     Attends Oriental orthodox service: Not on file     Active member of club or organization: Not on file     Attends meetings of clubs or organizations: Not on file     Relationship status: Not on file    Intimate partner violence:     Fear of current or ex partner: Not on file     Emotionally abused: Not on file     Physically abused: Not on file     Forced sexual activity: Not on file   Other Topics Concern    Not on file   Social History Narrative    Not on file     Allergies  No Known Allergies  Meds  No current outpatient medications on file      OBJECTIVE:               Vitals  Vitals:    07/23/20 1054   BP: 157/86   Pulse: 76   Resp: 16   Temp: 98 4 °F (36 9 °C)       Physical Exam    See proc doc    Yasir Poole DO  PGY-4 OB/GYN   7/23/2020 4:56 PM

## 2020-07-28 ENCOUNTER — TELEPHONE (OUTPATIENT)
Dept: OBGYN CLINIC | Facility: CLINIC | Age: 55
End: 2020-07-28

## 2020-07-28 NOTE — TELEPHONE ENCOUNTER
----- Message from Earl Garnett DO sent at 7/28/2020 11:59 AM EDT -----  Please inform patient that EMBx is negative for cancer, benign changes  Recommend she return to the office as needed as she is asymptomatic, Japanese speaking patient

## 2020-08-20 ENCOUNTER — OFFICE VISIT (OUTPATIENT)
Dept: INTERNAL MEDICINE CLINIC | Facility: CLINIC | Age: 55
End: 2020-08-20

## 2020-08-20 VITALS
HEART RATE: 78 BPM | SYSTOLIC BLOOD PRESSURE: 158 MMHG | WEIGHT: 289.68 LBS | OXYGEN SATURATION: 96 % | BODY MASS INDEX: 48.26 KG/M2 | DIASTOLIC BLOOD PRESSURE: 94 MMHG | TEMPERATURE: 98.1 F | HEIGHT: 65 IN

## 2020-08-20 DIAGNOSIS — R10.11 CHRONIC RUQ PAIN: Primary | ICD-10-CM

## 2020-08-20 DIAGNOSIS — I10 ESSENTIAL HYPERTENSION: ICD-10-CM

## 2020-08-20 DIAGNOSIS — R14.0 BLOATING SYMPTOM: ICD-10-CM

## 2020-08-20 DIAGNOSIS — E66.01 CLASS 3 SEVERE OBESITY DUE TO EXCESS CALORIES WITHOUT SERIOUS COMORBIDITY WITH BODY MASS INDEX (BMI) OF 45.0 TO 49.9 IN ADULT (HCC): Chronic | ICD-10-CM

## 2020-08-20 DIAGNOSIS — N28.1 COMPLEX RENAL CYST: ICD-10-CM

## 2020-08-20 DIAGNOSIS — K76.0 FATTY LIVER: ICD-10-CM

## 2020-08-20 DIAGNOSIS — G89.29 CHRONIC RUQ PAIN: Primary | ICD-10-CM

## 2020-08-20 PROBLEM — M54.2 NECK PAIN, BILATERAL: Chronic | Status: RESOLVED | Noted: 2019-09-17 | Resolved: 2020-08-20

## 2020-08-20 PROBLEM — K04.7 DENTAL ABSCESS: Chronic | Status: RESOLVED | Noted: 2019-09-17 | Resolved: 2020-08-20

## 2020-08-20 PROCEDURE — 1036F TOBACCO NON-USER: CPT | Performed by: PHYSICIAN ASSISTANT

## 2020-08-20 PROCEDURE — 3008F BODY MASS INDEX DOCD: CPT | Performed by: PHYSICIAN ASSISTANT

## 2020-08-20 PROCEDURE — 99213 OFFICE O/P EST LOW 20 MIN: CPT | Performed by: PHYSICIAN ASSISTANT

## 2020-08-20 RX ORDER — HYDROCHLOROTHIAZIDE 12.5 MG/1
12.5 TABLET ORAL DAILY
Qty: 90 TABLET | Refills: 0 | Status: SHIPPED | OUTPATIENT
Start: 2020-08-20 | End: 2021-01-20 | Stop reason: SDUPTHER

## 2020-08-20 RX ORDER — SIMETHICONE 125 MG
125 CAPSULE ORAL EVERY 6 HOURS PRN
Qty: 28 EACH | Refills: 1 | Status: SHIPPED | OUTPATIENT
Start: 2020-08-20 | End: 2021-04-14

## 2020-08-20 NOTE — PROGRESS NOTES
Assessment/Plan:  As we reviewed today there is no obvious cause for the discomfort you feel on your right side  We were not able to reproduce this pain today  You do however report abdominal gas and bloating after eating and this may be contributing to your symptoms  Please schedule the ultrasound for further evaluation and I have sent a prescription for medication that you may take 2 or 3 times daily as needed for gas and bloating  We also reviewed that you did not schedule the follow-up ultrasound for the cyst on your right kidney  Please schedule the bilateral kidney ultrasound for further evaluation and follow-up  We also discussed that your blood pressure has been elevated on 3 separate occasions and therefore we need to start you on medication for high blood pressure  This is hydrochlorothiazide 12 5 mg tablet once daily in the morning  You will notice increased frequency of urination in the beginning but this will decrease over time  Also reviewed importance of dietary changes with avoidance of caffeine, high salt diet and also exercise can help with healthy meaningful weight loss which not only helps your overall health but helps to reduce her blood pressure as well  Additional information provided to you today in [place]  Script also provided for all of the labs you need to get completed  We will contact you with the results of the ultrasound and to schedule a follow-up appointment here in 1 month to review your blood pressure on the medication  If however you have any new or worsening symptoms of your abdominal pain to call our office right away  No problem-specific Assessment & Plan notes found for this encounter  Diagnoses and all orders for this visit:    Chronic RUQ pain  -     US right upper quadrant; Future    Essential hypertension  -     hydrochlorothiazide (HYDRODIURIL) 12 5 mg tablet;  Take 1 tablet (12 5 mg total) by mouth daily  -     Comprehensive metabolic panel  -     Lipid Panel with Direct LDL reflex  -     Microalbumin / creatinine urine ratio    Bloating symptom  -     simethicone (MYLICON,GAS-X) 239 MG CAPS; Take 1 capsule (125 mg total) by mouth every 6 (six) hours as needed for flatulence (abdominal bloating)    Complex renal cyst  -     US retroperitoneal complete; Future    Fatty liver    Class 3 severe obesity due to excess calories without serious comorbidity with body mass index (BMI) of 45 0 to 49 9 in adult [facility])          Subjective:      Patient ID: Se Gardner is a 60 y o  female  Patient is scheduled for same-day medical home with complaint of R lateral abdominal / flank pain X 4 days states pain is not all the time not severe just comes and goes  Patient reports that she notices the discomfort when she rolls onto her right side not with any other activities  Patient had gallbladder removed years ago  Pain not related to food, no change to bowel or bladder  Patient denies any nausea vomiting  Patient does admit however that after she eats her abdomen does feel distended and gassy  After thorough examination I was not able to reproduce patient's symptoms  Even had her role on to her right side on the exam table and she did not have the symptoms she was reporting  Patient did have previous right upper quadrant ultrasound however at that time she was having true abdominal discomfort  Did demonstrate fatty liver  Also incidental finding of complex right renal cyst for which she was to follow-up with a renal ultrasound records shows she never schedule this  This test will be reordered today  Examination today is benign, no red flags or worrisome symptoms  Patient does need to schedule the ultrasounds but suspect some of her symptoms may be related to the gas and bloating she reports  Will provide patient with anti-gas medication to help alleviate that symptom      Also note patient's blood pressure remains elevated on today's visit  Record review shows it was elevated at [facility] [place] and on previous visit with me  Patient will need to be started on treatment for high blood pressure  Also noted patient never completed labs that were ordered in September of 2019  Patient also upset as her mother passed away in [place] [facility] due to not having surgery for her gallbladder, patient reports that she now has to work more in order to send money to her siblings in [place] [facility]   Addressed positive PHQ and directly related to the unexpected loss of her mother  Patient has 6 other siblings in [place] [facility] that she is now sending additional money  to help support them  Patient is having appropriate bereavement due to loss  The following portions of the patient's history were reviewed and updated as appropriate: allergies, current medications, past family history, past medical history, past social history, past surgical history and problem list     Review of Systems   Constitutional: Negative  Negative for chills and fever  Respiratory: Negative  Negative for cough and shortness of breath  Cardiovascular: Positive for palpitations (if drinks coffee)  Negative for chest pain  Gastrointestinal: Positive for abdominal distention  Negative for blood in stool, constipation, diarrhea, nausea and vomiting  Genitourinary: Negative for dysuria, frequency, hematuria and urgency  Musculoskeletal:        R side as noted in HPI   Skin: Negative for color change and rash  Neurological: Negative  Psychiatric/Behavioral: Positive for dysphoric mood  The patient is nervous/anxious  Objective:      /94 (BP Location: Left arm, Patient Position: Sitting, Cuff Size: Large)   Pulse 78   Temp 98 1 °F (36 7 °C) (Temporal)   Ht 5' 5" (1 651 m)   Wt 131 kg (289 lb 11 oz)   LMP  (LMP Unknown)   SpO2 96%   BMI 48 21 kg/m²          Physical Exam  Constitutional:       Appearance: She is obese  HENT:      Head: Normocephalic  Mouth/Throat:      Mouth: Mucous membranes are moist       Pharynx: Oropharynx is clear  Neck:      Musculoskeletal: Neck supple  Cardiovascular:      Rate and Rhythm: Normal rate and regular rhythm  Heart sounds: Normal heart sounds  No murmur  Pulmonary:      Effort: Pulmonary effort is normal       Breath sounds: Normal breath sounds  No wheezing or rhonchi  Abdominal:      General: Bowel sounds are normal  There is no distension  Palpations: Abdomen is soft  There is no mass  Tenderness: There is no abdominal tenderness  There is no right CVA tenderness, guarding or rebound  Hernia: No hernia is present  Musculoskeletal:         General: No tenderness  Skin:     Findings: No erythema, lesion or rash  Neurological:      General: No focal deficit present  Mental Status: She is alert  Psychiatric:         Mood and Affect: Mood normal          PHQ-9 Depression Screening    PHQ-9:    Frequency of the following problems over the past two weeks:       Little interest or pleasure in doing things:  2 - more than half the days  Feeling down, depressed, or hopeless:  2 - more than half the days  Trouble falling or staying asleep, or sleeping too much:  1 - several days  Feeling tired or having little energy:  2 - more than half the days  Poor appetite or overeatin - several days  Feeling bad about yourself - or that you are a failure or have let yourself or your family down:  1 - several days  Trouble concentrating on things, such as reading the newspaper or watching television:  0 - not at all  Moving or speaking so slowly that other people could have noticed  Or the opposite - being so fidgety or restless that you have been moving around a lot more than usual:  0 - not at all  Thoughts that you would be better off dead, or of hurting yourself in some way:  0 - not at all  PHQ-2 Score:  4  PHQ-9 Score:  9           BMI Counseling: Body mass index is 48 21 kg/m²   The BMI is above normal  Nutrition recommendations include reducing portion sizes, decreasing overall calorie intake, 3-5 servings of fruits/vegetables daily, reducing fast food intake, decreasing soda and/or juice intake, moderation in carbohydrate intake, increasing intake of lean protein, reducing intake of saturated fat and trans fat and reducing intake of cholesterol  Exercise recommendations include exercising 3-5 times per week

## 2020-08-20 NOTE — PATIENT INSTRUCTIONS
As we reviewed today there is no obvious cause for the discomfort you feel on your right side  We were not able to reproduce this pain today  You do however report abdominal gas and bloating after eating and this may be contributing to your symptoms  Please schedule the ultrasound for further evaluation and I have sent a prescription for medication that you may take 2 or 3 times daily as needed for gas and bloating  We also reviewed that you did not schedule the follow-up ultrasound for the cyst on your right kidney  Please schedule the bilateral kidney ultrasound for further evaluation and follow-up  We also discussed that your blood pressure has been elevated on 3 separate occasions and therefore we need to start you on medication for high blood pressure  This is hydrochlorothiazide 12 5 mg tablet once daily in the morning  You will notice increased frequency of urination in the beginning but this will decrease over time  Also reviewed importance of dietary changes with avoidance of caffeine, high salt diet and also exercise can help with healthy meaningful weight loss which not only helps your overall health but helps to reduce her blood pressure as well  Additional information provided to you today in Czech  Script also provided for all of the labs you need to get completed  We will contact you with the results of the ultrasound and to schedule a follow-up appointment here in 1 month to review your blood pressure on the medication  If however you have any new or worsening symptoms of your abdominal pain to call our office right away  Dieta saludable para el corazón   LO QUE NECESITA SABER:   ¿Qué es karyn dieta saludable para Deborha Lair? Jack Cook myrtle para el corazón es un plan alimenticio bajo en grasas totales, grasas no saludables y sodio (yara)   Jack Cook saludable para el corazón ayuda a disminuir echeverria riesgo de sufrir de enfermedades del Janeece Guise y un derrame cerebral  Limite la cantidad de grasa que consume entre un 25% a 35% del total de aneta calorías diarias  Limite el sodio por debajo de los 2,300 mg al día  ¿Qué es la grasa saludable y dónde se encuentra? Las grasas saludables ayudan a mejorar los niveles de [place]  El riesgo de karyn enfermedad cardíaca se disminuye cuando los niveles de colesterol son normales  Escoja grasas saludables virgie las siguientes:  · Las grasas no saturadas  se encuentran en alimentos virgie el frijol de soja, aceites de [place], de [place], de [place] y de [place]  Se encuentra también en la margarina suave hecha con aceite líquido vegetal      · Las grasas Omega 3  se encuentran en ciertos pescados, virgie el salmón, el atún y la [place], en las nueces y en la semilla de lizette  ¿Qué es karyn grasa perjudicial y dónde se encuentra? Las grasas "malas" pueden causar niveles perjudiciales de colesterol en echeverria ivory y aumentar el riesgo de karyn enfermedad cardíaca  Limite el consumo de grasas no saludables, virgie los siguientes:  · El colesterol  se encuentra en alimentos de origen animal, virgie los huevos y la langosta al igual que en productos lácteos hechos con leche entera  Limite el consumo de colesterol a menos de 300 milligramos (mg) al día  Es posible que necesite limitar el colesterol a 200 mg al día si sufre de karyn enfermedad cardíaca  · Las grasas saturadas  se encuentran en ortiz virgie el tocino y la hamburguesa  Estas se encuentran también en la piel del christal y del [place], [place] entera y [place]  Limite el consumo de grasas saturadas a menos del 7% del total de aneta calorías diarias  Limite las grasas saturadas a menos del 6% si usted tiene enfermedad cardíaca o tiene un mayor riesgo para esto  · La grasa trans  se encuentran en los alimentos envasados, virgie las papitas de bolsa y las [place]  También se encuentra en la margarina dura, algunos alimentos fritos y la manteca  Evite lo más que [employer] trans    ¿Qué alimentos o bebidas puedo consumir en karyn dieta saludable para el corazón? Solicite que echeverria nutricionista o el médico le indique cuántas porciones necesita consumir de los siguientes alimentos de cada arturo alimenticio:  · Granos:      ¨ Panes, cereales y pastas de [place] integral y Ozell Lias integral    ¨ Patatas fritas y galletas saladas bajas en grasa, bajas en sodio    · Verduras:      ¨ Brócoli, judías verdes, guisantes y espinacas    ¨ [place], col y habas    ¨ Zanahorias, patatas dulces, tomates y pimientos    ¨ Vegetales enlatados sin yara añadida    · Frutas:      ¨ Plátanos, melocotones, peras y harrell    ¨ Uvas, pasas y dátiles    ¨ [place], [place], [facility], [facility] y [facility]    ¨ [place], [place], melón y papaya    ¨ [place] mount y fresas    ¨ Conservas de frutas sin azúcares añadidos    · Productos lácteos bajos en grasa:      ¨ Leche sin grasa (descremada), leche 1%, leche baja en grasa de [place], anacardo o leche de soja fortificada con calcio    ¨ Queso cottage, queso bajo en grasa y yogur regular o congelado    · Michele y proteínas , uribe de carne [place] de res o cerdo (chuletas, pierna, anthony), el christal y el pavo sin piel, legumbres, productos de soya, las claras del huevo y nueces o ankit secos  ¿Cuáles alimentos o bebidas necesito limitar o evitar?   Pregúntele a echeverria médico o nutricionista sobre estos y otros alimentos que contienen altos niveles de isreal [place] y [place] que no son saludables:  · [employer] , virgie las comidas congeladas, Rich, macarrón con queso y cereales con más de 300 mg de sodio por porción    · Productos enlatados o mezclas secas  para pasteles, sopas o salsas    · Verduras con sodio agregada , virgie las kallie instantáneas, verduras con salsas agregadas o conservas regulares de verduras    · Otros alimentos altos en sodio , virgie la salsa de [place], salsa de [facility], aderezo para [place], encurtidos de [place], UPPER STONE, salsa de soya y miso    · Alimentos lácteos con alto contenido de Smithers Avanza-Agorafy Squibb entera o 2%, queso crema o crema agria y quesos     · Alimentos con proteínas altas en grasa  uribe de carne ([address], las chuletas con hueso), el christal o pavo sin piel y las vísceras de animal virgie el hígado    · Michele curadas o [place] , virgie las salchichas, el tocino y salchichones    · Aceites y grasas poco saludables , virgie la [place], margarina en Larrie Ash y aceites para cocinar virgie el aceite de lani o jones    · Alimentos y bebidas altas en azúcar , tales virgie refrescos (gaseosas), bebidas deportivas, té azucarado, dulces, pasteles, galletas, tartas y donas  ¿Qué otras pautas para la dieta reid seguir? · Consuma más alimentos que contengan grasas Omega-3  Consuma pescado con altas cantidades de Omega-3 por lo menos 2 veces a la semana  · Limite el consumo de alcohol  Demasiado alcohol puede dañar echeverria corazón y elevar echeverria presión arterial  Las mujeres deberían limitar el consumo de alcohol a 1 bebida por día  Los hombres deberían limitar el consumo de alcohol a 2 tragos al día  Un trago equivale a 12 onzas de cerveza, 5 onzas de vino o 1 onza y ½ de licor  · Escoja alimentos bajos en sodio  Alimentos altos de sodio pueden conducir a la hipertensión  Al preparar la comida añada muy poca sal o no use sal  Use hierbas y especias en vez de la sal     · Consuma más fibra  para ayudar a bajar los niveles de [place]  Consuma al menos 5 porciones de frutas y verduras todos los días  Consuma 3 onzas de alimentos integrales al día  [place] Plume (fríjoles) son Tacey Desi buena carolina de Mattie  ¿Qué pautas de estilo de fletcher debería seguir? · No fume  La nicotina y otros químicos contenidos en los cigarrillos y cigarros pueden causar daño a aneta pulmones y el corazón  Pida información a echeverria médico si usted actualmente fuma y necesita ayuda para dejar de fumar  Los cigarrillos electrónicos o tabaco sin humo todavía contienen nicotina   Consulte con echeverria médico antes de [employer]  · Lester Fearing regularmente  para que le ayude a mantener un peso saludable y mejorar deleon presión arterial y niveles de colesterol  Pregunte a deleon médico acerca del mejor plan de ejercicio para usted  No empiece un programa de ejercicios sin antes consultar con deleon médico    ACUERDOS SOBRE DELEON CUIDADO:   Usted tiene el derecho de ayudar a planear deleon cuidado  Discuta aneta opciones de tratamiento con aneta médicos para decidir el cuidado que usted desea recibir  Usted siempre tiene el derecho de rechazar el tratamiento  Esta información es sólo para uso en educación  Deleon intención no es darle un consejo médico sobre enfermedades o tratamientos  Colsulte con deleon Ladena Creed farmacéutico antes de seguir cualquier régimen médico para saber si es seguro y efectivo para usted  © 2017 [address] Information is for End User's use only and may not be sold, redistributed or otherwise used for commercial purposes  All illustrations and images included in CareNotes® are the copyrighted property of A D A M , Inc  or Jeff Lebron  Plan de alimentación con "enfoque dietético para detener la hipertensión (DASH, por aneta siglas en inglés)   LO QUE NECESITA SABER:   ¿Qué es el plan de alimentación con enfoque dietético para detener la hipertensión (DASH, por aneta siglas en inglés)? El plan de alimentación DASH está diseñado para ayudar a prevenir o disminuir la hipertensión  También puede ayudar a bajar el colesterol daren (colesterol LDL) y disminuír deleon riesgo de enfermedad cardíaca  El plan es bajo en sodio, azúcar, grasas dañinas, y grasas en deleon totalidad  Es alto en potasio, calcio, magnesio y Mattie  Estos nutrientes se agregan al consumir más frutas, vegetales y granos enteros  ¿Cuál es mi límite de sodio por día? Deleon dietista le indicará la cantidad de sodio que usted debe consumir a diario   La gente que tiene la presión arterial sarah debe consumir de 1,500 a 2,300 mg de sodio al día virgie matias  Karyn cucharadita (cdta) de sal tiene 2,300 mg de sodio  [place] puede parecer virgie karyn meta difícil, martha pequeños cambios en los alimentos que usted consume pueden hacer karyn gran diferencia  Echeverria médico o dietista puede ayudarlo a crear un plan alimenticio que cumpla echeverria límite de sodio  ¿Cómo limito mi consumo de sodio? · Keshia las etiquetas de los alimentos  Las etiquetas pueden ayudarle a escoger alimentos bajos en sodio  La cantidad de sodio está incluida en miligramos (mg)  La columna del porcentaje de valor diario indica la cantidad de necesidades diarias satisfechas con 1 porción del alimento para cada nutriente en la lista  Escoja alimentos que tengan menos de 5% del porcentaje diario de [place]  Estos alimentos se consideran bajos en sodio  Los alimentos que tienen 20% o más del porcentaje diario de sodio se consideran alimentos altos en sodio  Evite alimentos que tengan más de 300 mg de sodio por porción  Escoja alimentos Melene Mary diga que son bajos en sodio, con sodio reducido, o sin sal agregada  · Evite la sal   No le agregue sal a la comida cuando se siente a la piedra a comer, y agregue muy poca sal a los alimentos barby la cocción  Use hierbas y condimentos, virgie cebollas, ajo y especias sin sal para agregar sabor a los alimentos  Use jugo de lima, geovani o vinagre para darle a los alimentos un sabor ácido  Use chiles picantes o karyn cantidad pequeña de salsa picante para agregar un sabor picante a los alimentos  · Pregunte sobre los sustitutos para la sal   Pregúntele a echeverria médico si es posible usar sustitutos de la sal  Algunos sustitutos de la sal vienen con ingredientes que pueden ser dañinos si usted tiene ciertos padecimientos médicos  · Escoja los alimentos cuidadosamente cuando sale a comer a restaurantes  las comidas de los restaurantes, sobre todo restaurantes de comida rápida, cecelia siempre son altas en sodio   Popeye Renee restaurantes ofrecen información nutricional que indica la cantidad de sodio en aneta alimentos  Pida que preparen aneta comidas con menos sal o sin sal   ¿Qué reid saber sobre las grasas? · Incluya grasas saludables  Las grasas insaturadas y los ácidos grasos omega-3 son ejemplos de grasas saludables  Las grasas insaturadas se encuentran en los aceites de soja, can[place], [place] o [place] y la margarina Marycruz Risk y [facility]  Los ácidos grasos [place] 3 se encuentran en el pescado con grasa, virgie el salmón, el atún, la caballa y las kelsea  También se le puede encontrar en el aceita de linaza y en la linaza molida  · Evite las grasas insalubres  No consuma grasas dañinas, virgie grasas saturadas y grasas trans  Las grasas saturadas se encuentran en alimentos que contienen grasa animal  Voncile Plate son Balinda Ream grasosas, la [place], la [place], la crema y otros productos lácteos  Además se pueden encontrar en la [place], la margarina en gem, el aceite de jones y el aceite de lani  Las grasas trans se encuentran en comidas fritas, galletas estilo soda, tortillitas tostadas, y alimentos horneados hechos con Sherial Soja  ¿Qué alimentos debería incluir? Con el plan de alimentación DASH, usted necesita consumir un número específico de porciones de cada arturo de alimentos  [place] le ayudará a consumir las cantidades suficientes de ciertos nutrientes y limitar otros  La cantidad de porciones que usted debe comer depende de la cantidad de calorías que usted necesita  Bermudez dietista le informará sobre cuántas calorías necesita usted  El número de porciones que viene en la lista al lado de los grupos alimenticios a continuación es para las personas que necesitan aproximadamente 2,000 calorías al día    · Granos:  6 a 8 porciones (3 de estas porciones deben ser de alimentos de granos enteros o integrales)    ¨ 1 tajada de pan integral     ¨ 1 onza de cereal seco    ¨ ½ taza de cereal cocinado, pasta, o arroz integral    · Verduras y frutas:  4 a 5 porciones de frutas y 4 a 5 porciones de vegetales    ¨ 1 fruta mediana    ¨ 1 taza de vegetales crudos de hojas    ¨ ½ taza de vegetales congelados, enlatados (sin sal adicional), o vegetales frescos y picados     ¨ ½ taza de fruta fresca, congelada, seca o enlatada (enlatada en sirope liviano o jugo de fruta)    ¨ ½ taza de jugo de verduras o frutas    · Productos lácteos:  2 a 3 porciones    ¨ 1 taza de [employer] o leche 1%    ¨ 1½ onzas de queso descremado o bajo en grasas y bajo en sodio    ¨ 6 onzas de yogurt descremado o bajo en grasas    · Michele hernadez, michele elizabeth y pescado magros:  6 onzas o menos    ¨ Michele elizabeth (christal, pavo) sin piel    ¨ Pescado (sobre todo pescado con grasa, virgie salmón, atún fresco o GARG)    ¨ Carne de res o de cerdo magra (lomo, carne molida extra [place])    ¨ Claras de huevo y sustitutos del huevo    · [place] du sac, semillas y legumbres:  4 a 5 porciones por semana    ¨ ½ taza de frijoles y arbejas cocinadas    ¨ 1½ onzas de nueces sin sal    ¨ 2 cucharadas de mantequilla de maní o semillas    · Dulces y azúcares adicionales:  5 o menos por semana    ¨ 1 cucharada de azúcar, jalea o mermelada    ¨ ½ taza de sorbeto o gelatina    ¨ 1 taza de limonada    · Grasas:  2 a 3 porciones por semana    ¨ 1 cucharadita de margarina suave o aceite vegetal    ¨ 1 cucharada de [place]    ¨ 2 cucharadas de aderezo para ensaladas  ¿Qué alimentos reid evitar?    · Granos:      ¨ Postres horneados o fritos virgie donas, pastelitos, galletas, y quequitos (altos en grasas y azúcar)    ¨ Mezclas para hacer pan de maíz y pasteles, alimentos empacados, virgie rellenos para [place], mezclas para hacer arroz y pasta, macarrones con [place], y cereales instantáneos (altos en sodio)    · Frutas y verduras:      ¨ Vegetales regulares enlatados (altos en sodio)    ¨ Repollo preparado en vinagre, vegetales en vinagre, y otros alimentos preparados en escabeche (altos en sodio)    ¨ Vegetales fritos o vegetales en mantequilla o salsas altas en grasas    ¨ Frutas en crema o salsa de mantequilla (altas en grasas)    · Productos lácteos:      ¨ Leche entera, leche semi descremada (2%), y crema (sarah en grasas)    ¨ Queso regular y queso procesado (alto en grasa y sodio)    · Michele y alimentos con proteínas:      ¨ Michele ahumadas o curadas, virgie carne de cm en conserva, tocino, jamón, perros calientes, y salchicha (sarah en grasas y sodio)    ¨ Frijoles enlatados y michele enlatadas o michele en pasta, virgie michele en conserva, kelsea, anchoas y [address] de imitación (altos en sodio)    ¨ Michele para emparedados, virgie [place], [place], [place], y carne de res en rebanada (altos en sodio)    ¨ Michele altas en grasas (bistec estilo [place], carne molida para hamburguesas, costillas)    ¨ Huevos enteros y yemas de huevo (altos en grasas)    · Otros:      ¨ Condimentos hechos con sal, virgie sal de ajo, sal de apio, sal de cebolla, sal condimentada, suavizantes para michele, y glutamato de monosodio (MSG, por aneta siglas en inglés)    ¨ Sopa Miso y mezclas para sopa enlatadas o secas (altas en sodio)    ¨ Salsa de soya regular, salsa de [address], salsa [place], salsa para bistec, [employer], y la mayoría de las vinagres con sabor (altas en sodio)    ¨ Condimentos regulares, virgie mostaza, salsa de tomate y aderezos para ensalada (altos en sodio)    ¨ Salsa para michele y salsas en general, virgie salsa [place] o de queso (altas en sodio y [place])    ¨ Bebidas altas en azúcar, virgie bebidas gaseosas o jugos de frutas    ¨ Alimentos para merendar, virgie kallie tostadas, palomitas de [place], pretzels, piel de cerdo, galletas de soda [place], y nueces saladas    ¨ Alimentos congelados, virgie cenas preparadas, platos principales, vegetales con salsas, y michele cubiertas en pan (altas en sodio)  ¿Qué otras pautas reid seguir? · Mantenga un peso saludable    Bermudez riesgo de enfermedad cardíaca es aún más alto si usted tiene sobrepeso  Deleon médico podría sugerirle que adelgace si tiene sobrepeso  Usted puede perder peso si se propone consumir menos calorías y alimentos que tengan azúcar y grasas agregadas  El plan de alimentación DASH puede ayudarle a lograrlo  Loan buena forma de disminuir el consumo de calorías es consumiendo porciones más pequeñas en cada comida y menos meriendas entre comidas  Consulte a deleon médico para obtener más información sobre cómo adelgazar  · Realice actividad física con regularidad  El ejercicio regular puede ayudarle a alcanzar o mantener un peso saludable  El ejercicio regular también puede ayudarle a disminuir deleon presión arterial y mejorar aneta niveles de colesterol  Rohith ejercicios moderados por 30 minutos o más todos los días de la [place]  Para bajar peso, asegúrese de ejercitarse por lo menos 60 minutos  Consulte con deleon médico sobre un programa de ejercicio adecuado para usted  · Limite el consumo de alcohol  Las mujeres deberían limitar el consumo de alcohol a 1 bebida por día  Los hombres deberían limitar el consumo de alcohol a 2 tragos al día  Un trago equivale a 12 onzas de cerveza, 5 onzas de vino o 1 onza y ½ de licor  ¿Dónde puedo obtener más información? · [facility] [address]  [address] [address]  Danyelle Wu [place] [address]  Phone: [phone]  Web Address: [employer] no  ACUERDOS Tahmina Albert DELEON CUIDADO:   Alexey Scrivener tiene el derecho de ayudar a planear deleon cuidado  Discuta aneta opciones de tratamiento con aneta médicos para decidir el cuidado que usted desea recibir  Usted siempre tiene el derecho de rechazar el tratamiento  Esta información es sólo para uso en educación  Deleon intención no es darle un consejo médico sobre enfermedades o tratamientos  Colsulte con deleon Barahona Hand farmacéutico antes de seguir cualquier régimen médico para saber si es seguro y efectivo para usted    © 2017 [facility] Information is for End User's use only and may not be sold, redistributed or otherwise used for commercial purposes  All illustrations and images included in CareNotes® are the copyrighted property of A D A M , Inc  or Jeff Lebron

## 2020-08-28 ENCOUNTER — HOSPITAL ENCOUNTER (OUTPATIENT)
Dept: RADIOLOGY | Facility: HOSPITAL | Age: 55
Discharge: HOME | End: 2020-08-28

## 2020-08-28 ENCOUNTER — APPOINTMENT (OUTPATIENT)
Dept: LAB | Facility: HOSPITAL | Age: 55
End: 2020-08-28

## 2020-08-28 DIAGNOSIS — G89.29 CHRONIC RUQ PAIN: ICD-10-CM

## 2020-08-28 DIAGNOSIS — Z11.3 ROUTINE SCREENING FOR STI (SEXUALLY TRANSMITTED INFECTION): ICD-10-CM

## 2020-08-28 DIAGNOSIS — R10.11 CHRONIC RUQ PAIN: ICD-10-CM

## 2020-08-28 LAB
ALBUMIN SERPL BCP-MCNC: 3.4 G/DL (ref 3.5–5)
ALP SERPL-CCNC: 72 U/L (ref 46–116)
ALT SERPL W P-5'-P-CCNC: 28 U/L (ref 12–78)
ANION GAP SERPL CALCULATED.3IONS-SCNC: 3 MMOL/L (ref 4–13)
AST SERPL W P-5'-P-CCNC: 21 U/L (ref 5–45)
BILIRUB SERPL-MCNC: 0.83 MG/DL (ref 0.2–1)
BUN SERPL-MCNC: 9 MG/DL (ref 5–25)
CALCIUM SERPL-MCNC: 8.5 MG/DL (ref 8.3–10.1)
CHLORIDE SERPL-SCNC: 106 MMOL/L (ref 100–108)
CHOLEST SERPL-MCNC: 191 MG/DL (ref 50–200)
CO2 SERPL-SCNC: 32 MMOL/L (ref 21–32)
CREAT SERPL-MCNC: 0.64 MG/DL (ref 0.6–1.3)
CREAT UR-MCNC: 53.7 MG/DL
GFR SERPL CREATININE-BSD FRML MDRD: 99 ML/MIN/1.73SQ M
GLUCOSE P FAST SERPL-MCNC: 94 MG/DL (ref 65–99)
HDLC SERPL-MCNC: 63 MG/DL
LDLC SERPL CALC-MCNC: 104 MG/DL (ref 0–100)
MICROALBUMIN UR-MCNC: 5 MG/L (ref 0–20)
MICROALBUMIN/CREAT 24H UR: 9 MG/G CREATININE (ref 0–30)
POTASSIUM SERPL-SCNC: 4.2 MMOL/L (ref 3.5–5.3)
PROT SERPL-MCNC: 7.5 G/DL (ref 6.4–8.2)
SODIUM SERPL-SCNC: 141 MMOL/L (ref 136–145)
T4 FREE SERPL-MCNC: 1.02 NG/DL (ref 0.76–1.46)
TRIGL SERPL-MCNC: 118 MG/DL
TSH SERPL DL<=0.05 MIU/L-ACNC: 3.82 UIU/ML (ref 0.36–3.74)

## 2020-08-28 PROCEDURE — 82570 ASSAY OF URINE CREATININE: CPT | Performed by: PHYSICIAN ASSISTANT

## 2020-08-28 PROCEDURE — 36415 COLL VENOUS BLD VENIPUNCTURE: CPT | Performed by: PHYSICIAN ASSISTANT

## 2020-08-28 PROCEDURE — 82043 UR ALBUMIN QUANTITATIVE: CPT | Performed by: PHYSICIAN ASSISTANT

## 2020-08-28 PROCEDURE — 84443 ASSAY THYROID STIM HORMONE: CPT | Performed by: PHYSICIAN ASSISTANT

## 2020-08-28 PROCEDURE — 76705 ECHO EXAM OF ABDOMEN: CPT

## 2020-08-28 PROCEDURE — 80053 COMPREHEN METABOLIC PANEL: CPT | Performed by: PHYSICIAN ASSISTANT

## 2020-08-28 PROCEDURE — 84439 ASSAY OF FREE THYROXINE: CPT | Performed by: PHYSICIAN ASSISTANT

## 2020-08-28 PROCEDURE — 87389 HIV-1 AG W/HIV-1&-2 AB AG IA: CPT

## 2020-08-28 PROCEDURE — 80061 LIPID PANEL: CPT | Performed by: PHYSICIAN ASSISTANT

## 2020-08-29 LAB — HIV 1+2 AB+HIV1 P24 AG SERPL QL IA: NORMAL

## 2020-09-01 ENCOUNTER — TRANSCRIBE ORDERS (OUTPATIENT)
Dept: RADIOLOGY | Facility: HOSPITAL | Age: 55
End: 2020-09-01

## 2020-09-01 ENCOUNTER — HOSPITAL ENCOUNTER (OUTPATIENT)
Dept: RADIOLOGY | Facility: HOSPITAL | Age: 55
Discharge: HOME | End: 2020-09-01

## 2020-09-01 DIAGNOSIS — N28.1 COMPLEX RENAL CYST: ICD-10-CM

## 2020-09-01 PROCEDURE — 76770 US EXAM ABDO BACK WALL COMP: CPT

## 2020-09-04 ENCOUNTER — TELEPHONE (OUTPATIENT)
Dept: INTERNAL MEDICINE CLINIC | Facility: CLINIC | Age: 55
End: 2020-09-04

## 2020-09-17 ENCOUNTER — OFFICE VISIT (OUTPATIENT)
Dept: OBGYN CLINIC | Facility: CLINIC | Age: 55
End: 2020-09-17

## 2020-09-17 VITALS
SYSTOLIC BLOOD PRESSURE: 150 MMHG | DIASTOLIC BLOOD PRESSURE: 84 MMHG | HEART RATE: 88 BPM | BODY MASS INDEX: 47.26 KG/M2 | WEIGHT: 284 LBS | RESPIRATION RATE: 16 BRPM | TEMPERATURE: 98 F

## 2020-09-17 DIAGNOSIS — N84.0 ENDOMETRIAL POLYP: Primary | ICD-10-CM

## 2020-09-17 PROCEDURE — 99213 OFFICE O/P EST LOW 20 MIN: CPT | Performed by: OBSTETRICS & GYNECOLOGY

## 2020-09-18 NOTE — PROGRESS NOTES
GYN Follow up visit  Discuss EMB results  Mau  2020    RF nano  used for Estonian interpretation  Ms Aimee Reed is a 61 yo postenopausal female who presents to discuss results of her endometrial biopsy completed on 2020  In short- Ms Lamar Smith was sent for a pelvic ultrasound for pelvic pain 2 years ago  A polyp was noted on ultrasound and patient was initially planned to schedule a D&C hysteroscopy  Patient followed up 3/12/2020 was sent for repeat ultrasound which resulted with persistent polyp  Patient was seen by Dr Melanie Jacob on 2020 and had endometrial biopsy completed at that time- patient was explained if insufficient tissue was obtained that she would need to undergo D&C hysteroscopy  She reports she has been menopausal for 4 years  Patient denies any history of postmenopausal bleeding  She denies any familial history of cancer  Her past medical history significant for obesity, grand multiparity and hypertension  /84 (BP Location: Left arm, Patient Position: Sitting, Cuff Size: Large)   Pulse 88   Temp 98 °F (36 7 °C)   Resp 16   Wt 129 kg (284 lb)   LMP  (LMP Unknown)   BMI 47 26 kg/m²   General: appears clam and in no acute distress, pleasant demeanor    Transvaginal ultrasound 2020: The endometrial thickness is between 11 and 12 mm and there is a hypervascular hyperechoic nodule measuring 11 x 6 x 6 mm suspicious for polyp  Endometrial biopsy 2020: Benign inactive/atrophic endometrium with benign metaplastic changes, negative for hyperplasia or carcinoma  A/P:  61 yo  obese female with thickened endometrium suspicious for polyp- endometrial biopsy returned with no concern for malignancy or hyperplasia  Given patient is asymptomatic and has not had any postmenopausal bleeding with benign endometrial biopsy D&C hysteroscopy is not recommended at this time    If for any reason she is began to have postmenopausal bleeding and a later time would recommend proceeding with D&C hysteroscopy and polypectomy  Reviewed these recommendations with patient and all questions and concerns addressed to her apparent satisfaction  Patient was recommended to follow up with her primary care physician given elevated blood pressure of 150/84 today in office  Discussed with Julia Mejia and Daniele    Greater than 50% of today's visit was counseling      Linda Wilson MD  09/18/20

## 2020-09-22 ENCOUNTER — TELEPHONE (OUTPATIENT)
Dept: OBGYN CLINIC | Facility: CLINIC | Age: 55
End: 2020-09-22

## 2020-09-22 NOTE — TELEPHONE ENCOUNTER
----- Message from Blayne Falcon MD sent at 9/17/2020  5:10 PM EDT -----  Hi! The attached patient is Hebrew speaking and I could use some help with interpretation- we had a long discussion in office yesterday about her future care and I let her know that I would review her case with the surgical committee  Can you please call her and let her know that we all agreed to not proceed with surgery and to return if she ever experiences vaginal bleeding  Otherwise she may just return for her regular annual well woman visits  Thank you so much for your help! If you are not comfortable for any reason discussing please let me know  Kind regards,  Meredith Mcqueen  ----- Message -----  From: Zackery Hurst MD  Sent: 9/17/2020   3:02 PM EDT  To: Blayne Falcon MD, #    I think that is fine as long as she is reliable and you feel she will return if she starts to bleed  Thanks!  ----- Message -----  From: Blayne Falcon MD  Sent: 9/17/2020  10:00 AM EDT  To: Zackery Hurst MD, Hannah Mcgrath MD    Hi Dr Brayan Selby! I just wanted to review this case with you  It was also reviewed with Dr Rosalia Bryant  Ms  Melissa Jcakson is 61 yo female who had an ultrasound completed 2 years ago for pelvic pain and an incidental finding of an endometrial polyp was noted  An EMB was attempted at that time and was unsuccessful  She had another ultrasound completed on 06/2020 which showed endometrial thickness is between 11 and 12 mm and there is a hypervascular hyperechoic nodule measuring 11 x 6 x 6 mm suspicious for polyp  She had an EMB completed 7/2020 in office which was successful and showed  benign inactive/atrophic endometrium with benign metaplastic changes, negative for hyperplasia or carcinoma  This patient has been postmenopausal for the past 4 years and denies having any vaginal bleeding in not times she is not symptomatic from her polyp  She does however have risk factors her BMI is 47   Given her endometrial biopsy in the fact that she is not symptomatic from this polyp Dr Annel Jade and myself feel that she  but does not need a D&C hysteroscopy polypectomy unless for some reason she became symptomatic  She does not have any familial cancer history I told the patient I would review her case with surgical committee to ensure this is the right Step for management  She was very thankful and was not particularly pushing to have surgery  I just wanted to get another opinion to make sure we were doing the right thing for the patient  Thank you!   Eleanor Lamb

## 2020-11-09 ENCOUNTER — TELEPHONE (OUTPATIENT)
Dept: INTERNAL MEDICINE CLINIC | Facility: CLINIC | Age: 55
End: 2020-11-09

## 2020-11-09 DIAGNOSIS — I83.893 SYMPTOMATIC VARICOSE VEINS OF BOTH LOWER EXTREMITIES: Primary | ICD-10-CM

## 2021-01-20 ENCOUNTER — CONSULT (OUTPATIENT)
Dept: MULTI SPECIALTY CLINIC | Facility: CLINIC | Age: 56
End: 2021-01-20

## 2021-01-20 VITALS
TEMPERATURE: 97.8 F | HEART RATE: 89 BPM | SYSTOLIC BLOOD PRESSURE: 141 MMHG | BODY MASS INDEX: 47.78 KG/M2 | DIASTOLIC BLOOD PRESSURE: 89 MMHG | WEIGHT: 286.8 LBS | HEIGHT: 65 IN

## 2021-01-20 DIAGNOSIS — I10 ESSENTIAL HYPERTENSION: ICD-10-CM

## 2021-01-20 DIAGNOSIS — I83.893 SYMPTOMATIC VARICOSE VEINS OF BOTH LOWER EXTREMITIES: ICD-10-CM

## 2021-01-20 PROCEDURE — 99203 OFFICE O/P NEW LOW 30 MIN: CPT | Performed by: SURGERY

## 2021-01-20 RX ORDER — HYDROCHLOROTHIAZIDE 12.5 MG/1
12.5 TABLET ORAL DAILY
Qty: 90 TABLET | Refills: 0 | Status: SHIPPED | OUTPATIENT
Start: 2021-01-20 | End: 2021-04-25

## 2021-01-20 NOTE — PROGRESS NOTES
Vascular Surgery Consult  Assessment:  57-yr old female with longstanding bilateral symptomatic (bleeding/pain) lower extremity varicose venous disease causing significant physical and emotional distress  PLAN:  Varicose veins of lower extremity  60year old female with chronic venous insufficiency and venous stasis ulcer of right heel   -will obtain bilateral lower extremity venous reflux study  -will prescribe compression stockings  -will refer patient to wound care as she will benefit from right leg unna boot  -will have patient follow up in 3 months to review venous duplex and reassess wound and symptoms  -patient understands plan and all questions answered        Diagnoses and all orders for this visit:    Symptomatic varicose veins of both lower extremities  -     Ambulatory referral to Vascular Surgery          Diagnosis:    Procedures/Surgeries:    Pathology:    Adjuvant Therapy:       Subjective:    Patient ID: Natasha Vlaadez is a 60 y o  female  57-yr old female presenting with bilateral lower extremity varicose veins and chronic LLE venous ulcer and pain  She says she first noticed the varicose veins about 30 years ago following a traumatic event  She reports significant bleeding from the RLE ulcer about 4 months ago with super-imposed infection requiring antibiotic therapy  She also reports a hx of similar bleeding on the left side said to have occurred about 4 years ago  She currently uses ace wraps on a daily basis and reports mild to moderate symptomatic relief  She had a venous study done in 2017 which showed deep and superficial venous system incompetence  She works in a [employer]  where she gets to stand for prolonged hours (~ 10 hours) daily  She neither smokes tobacco cigarettes nor consumes alcoholic beverages  No hx to suggestive pelvic malignancy or mass        The following portions of the patient's history were reviewed and updated as appropriate: allergies, current medications, past family history, past medical history, past social history, past surgical history and problem list     Past Medical History:   Diagnosis Date    Breast lump     last assessed: 2014    Essential hypertension 2020    Hemorrhoid     History of varicose veins     last assessed: 2017    Irritable bowel syndrome     last assessed: 2012     (spontaneous vaginal delivery)     last assessed: 2016      Past Surgical History:   Procedure Laterality Date    APPENDECTOMY      last assessed: 10/21/2014    GALLBLADDER SURGERY      last assessed: 10/21/2014    HERNIA REPAIR      TUBAL LIGATION      last assessed: 2016      Family History   Problem Relation Age of Onset    Kidney disease Mother     Heart attack Father     No Known Problems Sister     No Known Problems Brother     No Known Problems Son     No Known Problems Daughter     No Known Problems Son     No Known Problems Son     No Known Problems Son     No Known Problems Son     No Known Problems Daughter     No Known Problems Daughter       Social History     Socioeconomic History    Marital status:      Spouse name: Not on file    Number of children: Not on file    Years of education: Not on file    Highest education level: Not on file   Occupational History    Not on file   Social Needs    Financial resource strain: Not hard at all   John-Artem insecurity     Worry: Never true     Inability: Never true    Transportation needs     Medical: No     Non-medical: No   Tobacco Use    Smoking status: Never Smoker    Smokeless tobacco: Never Used   Substance and Sexual Activity    Alcohol use: Never     Frequency: Never     Binge frequency: Never    Drug use: Never    Sexual activity: Not Currently     Partners: Male     Birth control/protection: Female Sterilization     Comment: tubal ligation surgery   Lifestyle    Physical activity     Days per week: 0 days     Minutes per session: 0 min    Stress: Not at all   Relationships    Social connections     Talks on phone: More than three times a week     Gets together: Three times a week     Attends [employer] service: Never     Active member of club or organization: No     Attends meetings of clubs or organizations: Never     Relationship status:     Intimate partner violence     Fear of current or ex partner: No     Emotionally abused: No     Physically abused: No     Forced sexual activity: No   Other Topics Concern    Not on file   Social History Narrative    Not on file      Review of Systems   Constitutional: Negative for activity change, chills and fever  HENT: Negative  Eyes: Negative  Respiratory: Negative  Cardiovascular: Negative  Gastrointestinal: Negative  Endocrine: Negative  Genitourinary: Negative  Musculoskeletal: Negative for gait problem  Neurological: Negative  Psychiatric/Behavioral: Negative  Objective:   Physical Exam  Vitals signs reviewed  Exam conducted with a chaperone present  Constitutional:       General: She is not in acute distress  Appearance: She is obese  She is not toxic-appearing  HENT:      Head: Normocephalic and atraumatic  Eyes:      Pupils: Pupils are equal, round, and reactive to light  Neck:      Musculoskeletal: Normal range of motion  Cardiovascular:      Rate and Rhythm: Normal rate  Pulses: Normal pulses  Pulmonary:      Effort: Pulmonary effort is normal    Abdominal:      Palpations: Abdomen is soft  Tenderness: There is no abdominal tenderness  Musculoskeletal:      Comments: Bilateral lower extremity varicose veins in the GSV distribution/territory  Ulcer with overlying scab noted on the medial aspect of the right distal leg  Varicosities extend proximally to the distal thigh bilaterally  Healed ulcer (scar) noted in the left leg; medially in the distal 3 rd  Pedal pulses palpable bilaterally     Skin:     General: Skin is warm  Capillary Refill: Capillary refill takes less than 2 seconds  Neurological:      General: No focal deficit present  Mental Status: She is alert and oriented to person, place, and time  Psychiatric:         Mood and Affect: Mood normal      /89 (BP Location: Left arm, Patient Position: Sitting, Cuff Size: Standard)   Pulse 89   Temp 97 8 °F (36 6 °C) (Temporal)   Ht 5' 5" (1 651 m)   Wt 130 kg (286 lb 12 8 oz)   LMP  (LMP Unknown)   BMI 47 73 kg/m²     No Chest XR results available for this patient  No CT Chest results available for this patient  No CT Chest,Abdomen,Pelvis results available for this patient  No NM PET CT results available for this patient  No Barium Swallow results available for this patient

## 2021-01-20 NOTE — TELEPHONE ENCOUNTER
Requested Prescriptions     Pending Prescriptions Disp Refills    hydrochlorothiazide (HYDRODIURIL) 12 5 mg tablet 90 tablet 0     Sig: Take 1 tablet (12 5 mg total) by mouth daily

## 2021-01-20 NOTE — ASSESSMENT & PLAN NOTE
60year old female with chronic venous insufficiency and venous stasis ulcer of right heel   -will obtain bilateral lower extremity venous reflux study  -will prescribe compression stockings  -will refer patient to wound care as she will benefit from right leg unna boot  -will have patient follow up in 3 months to review venous duplex and reassess wound and symptoms  -patient understands plan and all questions answered

## 2021-03-17 ENCOUNTER — TELEPHONE (OUTPATIENT)
Dept: INTERNAL MEDICINE CLINIC | Facility: CLINIC | Age: 56
End: 2021-03-17

## 2021-03-17 NOTE — TELEPHONE ENCOUNTER
Patient transfer to me,per patient she is been with chest pain for 15 days but this past 2 days patient is experiencing sob,left arm pain,dizziness,headaches and she feel super tired  Last night she report to have a headaches the she need to go outside to get air because she feel dizzy  Per patient she have family history of heart attacks  I recommend patient to go to the ED to be evaluated  Patient agree and she is going to the ED today

## 2021-03-17 NOTE — TELEPHONE ENCOUNTER
PATIENT CALLED IN CONCERNED SHE HAS BEEN EXPERIENCING CHEST DISCOMFORT AND WOKE UP WITH SOME SHORTNESS OF BREATH SCHEDULED SAME DAY APT WITH PCP AND TRANSFERRED TO SPEAK TO MA Fairy Bloch

## 2021-03-31 ENCOUNTER — HOSPITAL ENCOUNTER (OUTPATIENT)
Dept: NON INVASIVE DIAGNOSTICS | Facility: CLINIC | Age: 56
Discharge: HOME | End: 2021-03-31
Payer: COMMERCIAL

## 2021-03-31 DIAGNOSIS — I83.893 SYMPTOMATIC VARICOSE VEINS OF BOTH LOWER EXTREMITIES: ICD-10-CM

## 2021-03-31 PROCEDURE — 93970 EXTREMITY STUDY: CPT

## 2021-04-01 PROCEDURE — 93970 EXTREMITY STUDY: CPT | Performed by: SURGERY

## 2021-04-13 DIAGNOSIS — R14.0 BLOATING SYMPTOM: ICD-10-CM

## 2021-04-14 RX ORDER — PYRITHIONE ZINC 10 MG/ML
LOTION/SHAMPOO TOPICAL
Qty: 28 CAPSULE | Refills: 0 | Status: SHIPPED | OUTPATIENT
Start: 2021-04-14 | End: 2022-08-05 | Stop reason: ALTCHOICE

## 2021-04-21 ENCOUNTER — OFFICE VISIT (OUTPATIENT)
Dept: MULTI SPECIALTY CLINIC | Facility: CLINIC | Age: 56
End: 2021-04-21

## 2021-04-21 VITALS
DIASTOLIC BLOOD PRESSURE: 82 MMHG | BODY MASS INDEX: 48.32 KG/M2 | HEART RATE: 93 BPM | HEIGHT: 65 IN | TEMPERATURE: 98.3 F | SYSTOLIC BLOOD PRESSURE: 135 MMHG | WEIGHT: 290 LBS

## 2021-04-21 DIAGNOSIS — L97.311 VARICOSE VEINS OF RIGHT LOWER EXTREMITY WITH ULCER OF ANKLE LIMITED TO BREAKDOWN OF SKIN (HCC): Primary | ICD-10-CM

## 2021-04-21 DIAGNOSIS — I83.013 VARICOSE VEINS OF RIGHT LOWER EXTREMITY WITH ULCER OF ANKLE LIMITED TO BREAKDOWN OF SKIN (HCC): Primary | ICD-10-CM

## 2021-04-21 PROCEDURE — 99214 OFFICE O/P EST MOD 30 MIN: CPT | Performed by: SURGERY

## 2021-04-21 RX ORDER — CHLORHEXIDINE GLUCONATE 0.12 MG/ML
15 RINSE ORAL ONCE
Status: CANCELLED | OUTPATIENT
Start: 2021-04-21 | End: 2021-04-21

## 2021-04-21 NOTE — PROGRESS NOTES
Vascular Surgery Clinic Note      Assessment/Plan:    Varicose veins of lower extremity  60year old female with bilateral lower extremity venous insufficiency and venous stasis ulcers, known to the vascular clinic, she has been compliant with leg elevation and compression stockings since last being seen in January    -continue with daily compression stockings  -continue with local wound care  -keep skin moisturized   -elevate legs (above level of the heart) when not standing  -will schedule patient for bilateral saphenous vein ablation    -patient planning to pay out of pocket as she has no insurance, will work with financial services to assistance with pricing and possible paras care         Diagnoses and all orders for this visit:    Varicose veins of right lower extremity with ulcer of ankle limited to breakdown of skin ([facility] [address] )  -     Case request operating room: ENDOVASCULAR LASER THERAPY (EVLT); Standing  -     Case request operating room: ENDOVASCULAR LASER THERAPY (EVLT)    Other orders  -     Diet NPO; Sips with meds; Standing  -     Void on call to OR; Standing  -     Insert peripheral IV; Standing  -     Nursing Communication CHG bath, have staff wash entire body (neck down) per pre op bathing protocol  Routine, evening prior to, and day of surgery ; Standing  -     Nursing Communication Swab both nares with Povidone-Iodine solution, EXCLUDE if patient has shellfish/Iodine allergy  Routine, day of surgery, on call to OR ; Standing  -     chlorhexidine (PERIDEX) 0 12 % oral rinse 15 mL  -     Place sequential compression device; Standing          Subjective:      Patient ID: Ayala Pena is a 60 y o  female  HPI  Patient presents today as part of a 3 month follow up for bilateral venous stasis disease due to venous insufficiency  In review, patient had a 30 year history of venous insufficiency complicated with episodes of bleeding ulceration    Since being evaluated in clinic last time, she has been managing her wounds with lotion and wraps, and all wounds are notably improved (see media tab)  She has been diligently saving and asking for help from family members to afford a procedure to treat her venous disease  Recent reflux study results noted with incompetence bilaterally in the GSV and deep veins  She has not gone to wound care clinic because she cannot afford it  She states she has not had any episodes of spontaneous bleeding since October, and prior to that the previous episode had been 6 years before  She would like to have the procedure performed whenever possible, and looks forward to working with the staff to make this happen  The following portions of the patient's history were reviewed and updated as appropriate: allergies, current medications, past family history, past medical history, past social history, past surgical history and problem list     Review of Systems   Constitutional: Negative for activity change, chills and fever  HENT: Negative for congestion and trouble swallowing  Eyes: Negative for photophobia and visual disturbance  Respiratory: Negative for chest tightness and shortness of breath  Cardiovascular: Positive for leg swelling  Negative for chest pain and palpitations  Gastrointestinal: Negative for abdominal distention and abdominal pain  Genitourinary: Negative for difficulty urinating and dysuria  Musculoskeletal: Negative for back pain and myalgias  Skin: Negative for color change and pallor  Neurological: Negative for dizziness and weakness  Hematological: Negative for adenopathy  Does not bruise/bleed easily  Psychiatric/Behavioral: Negative for agitation and confusion  All other systems reviewed and are negative          Objective:      /82 (BP Location: Left arm, Patient Position: Sitting, Cuff Size: Large)   Pulse 93   Temp 98 3 °F (36 8 °C) (Temporal)   Ht 5' 5" (1 651 m)   Wt 132 kg (290 lb)   LMP  (LMP Unknown)   BMI 48 26 kg/m²          Physical Exam  Vitals signs reviewed  Constitutional:       General: She is not in acute distress  Appearance: She is not toxic-appearing  HENT:      Head: Normocephalic and atraumatic  Right Ear: External ear normal       Left Ear: External ear normal       Nose: Nose normal       Mouth/Throat:      Mouth: Mucous membranes are moist       Pharynx: Oropharynx is clear  Eyes:      Extraocular Movements: Extraocular movements intact  Conjunctiva/sclera: Conjunctivae normal       Pupils: Pupils are equal, round, and reactive to light  Neck:      Musculoskeletal: Neck supple  No neck rigidity  Cardiovascular:      Rate and Rhythm: Normal rate and regular rhythm  Comments: Palpable DP bilaterally  Pulmonary:      Effort: Pulmonary effort is normal  No respiratory distress  Abdominal:      General: There is no distension  Palpations: Abdomen is soft  Tenderness: There is no abdominal tenderness  Musculoskeletal:         General: No swelling or deformity  Skin:     General: Skin is warm  Coloration: Skin is not jaundiced  Findings: Erythema present  Comments: Healed ulcerations noted  Please see media tab  Neurological:      General: No focal deficit present  Mental Status: She is alert and oriented to person, place, and time     Psychiatric:         Mood and Affect: Mood normal          Behavior: Behavior normal              Operative Scheduling Information:    Hospital:  [facility]    Physician:  Next Available    Surgery: Bilateral EVLT (pending pricing)    Urgency:  Standard    Level:  Level 3: Outpatients to be scheduled for elective surgery than can be delayed up to 4 weeks without reasonable expectation of detriment to patient    Case Length:  Normal    Post-op Bed:  Outpatient    OR Table:  Standard    Equipment Needs:  None    Medication Instructions:  None    Hydration:  No

## 2021-04-21 NOTE — ASSESSMENT & PLAN NOTE
60year old female with bilateral lower extremity venous insufficiency and venous stasis ulcers, known to the vascular clinic, she has been compliant with leg elevation and compression stockings since last being seen in January    -continue with daily compression stockings  -continue with local wound care  -keep skin moisturized   -elevate legs (above level of the heart) when not standing  -will schedule patient for bilateral saphenous vein ablation    -patient planning to pay out of pocket as she has no insurance, will work with financial services to assistance with pricing and possible paras care

## 2021-04-25 DIAGNOSIS — I10 ESSENTIAL HYPERTENSION: ICD-10-CM

## 2021-04-25 RX ORDER — HYDROCHLOROTHIAZIDE 12.5 MG/1
TABLET ORAL
Qty: 90 TABLET | Refills: 0 | Status: SHIPPED | OUTPATIENT
Start: 2021-04-25 | End: 2021-08-02

## 2021-07-15 ENCOUNTER — HOSPITAL ENCOUNTER (EMERGENCY)
Facility: HOSPITAL | Age: 56
Discharge: HOME | End: 2021-07-15
Attending: EMERGENCY MEDICINE | Admitting: EMERGENCY MEDICINE
Payer: COMMERCIAL

## 2021-07-15 VITALS
HEART RATE: 98 BPM | TEMPERATURE: 98.5 F | SYSTOLIC BLOOD PRESSURE: 169 MMHG | RESPIRATION RATE: 18 BRPM | DIASTOLIC BLOOD PRESSURE: 91 MMHG | OXYGEN SATURATION: 99 %

## 2021-07-15 DIAGNOSIS — B37.3 VAGINAL YEAST INFECTION: Primary | ICD-10-CM

## 2021-07-15 LAB
BACTERIA UR QL AUTO: ABNORMAL /HPF
BILIRUB UR QL STRIP: NEGATIVE
CLARITY UR: CLEAR
COLOR UR: COLORLESS
GLUCOSE UR STRIP-MCNC: NEGATIVE MG/DL
HGB UR QL STRIP.AUTO: ABNORMAL
KETONES UR STRIP-MCNC: NEGATIVE MG/DL
LEUKOCYTE ESTERASE UR QL STRIP: NEGATIVE
NITRITE UR QL STRIP: NEGATIVE
NON-SQ EPI CELLS URNS QL MICRO: ABNORMAL /HPF
PH UR STRIP.AUTO: 7 [PH]
PROT UR STRIP-MCNC: NEGATIVE MG/DL
RBC #/AREA URNS AUTO: ABNORMAL /HPF
SP GR UR STRIP.AUTO: 1.01 (ref 1–1.03)
UROBILINOGEN UR QL STRIP.AUTO: 0.2 E.U./DL
WBC #/AREA URNS AUTO: ABNORMAL /HPF

## 2021-07-15 PROCEDURE — 81001 URINALYSIS AUTO W/SCOPE: CPT

## 2021-07-15 PROCEDURE — 99284 EMERGENCY DEPT VISIT MOD MDM: CPT | Performed by: EMERGENCY MEDICINE

## 2021-07-15 PROCEDURE — 99283 EMERGENCY DEPT VISIT LOW MDM: CPT

## 2021-07-15 RX ORDER — FLUCONAZOLE 100 MG/1
200 TABLET ORAL ONCE
Status: COMPLETED | OUTPATIENT
Start: 2021-07-15 | End: 2021-07-15

## 2021-07-15 RX ORDER — CLOTRIMAZOLE 1 %
1 CREAM WITH APPLICATOR VAGINAL
Qty: 45 G | Refills: 0 | Status: SHIPPED | OUTPATIENT
Start: 2021-07-15 | End: 2021-07-22

## 2021-07-15 RX ADMIN — FLUCONAZOLE 200 MG: 100 TABLET ORAL at 18:55

## 2021-07-15 NOTE — ED PROVIDER NOTES
History  Chief Complaint   Patient presents with    Vaginal Pain     pt c/o vaginal/pelvic pain x10 days  59 yo F with no PMHx presents for 10 days of vaginal burning  Pt reports she used an apple cider vinegar douche 10 days ago for vaginal dryness and has had burning pain since then  Pt was rx'd premarin vaginal cream at an ER in [place] at the start of her sxs without relief  Pt denies vaginal bleeding, vaginal discharge, fevers, abdominal pain, n/v/d, and dysuria   #[id number]  History provided by:  Patient   used: Yes    Vaginal Pain  Associated symptoms: no abdominal pain, no chest pain, no cough, no ear pain, no fever, no rash, no shortness of breath, no sore throat and no vomiting        Prior to Admission Medications   Prescriptions Last Dose Informant Patient Reported? Taking?    GAS RELIEF 125 MG CAPS   No No   Sig: TAKE 1 CAPSULE BY MOUTH EVERY 6 HOURS AS NEEDED FOR FLATULENCE( ABDOMINAL BLOATING)   Patient not taking: Reported on 2021   hydrochlorothiazide (HYDRODIURIL) 12 5 mg tablet   No No   Sig: TAKE 1 TABLET(12 5 MG) BY MOUTH DAILY      Facility-Administered Medications: None       Past Medical History:   Diagnosis Date    Breast lump     last assessed: 2014    Essential hypertension 2020    Hemorrhoid     History of varicose veins     last assessed: 2017    Irritable bowel syndrome     last assessed: 2012     (spontaneous vaginal delivery)     last assessed: 2016       Past Surgical History:   Procedure Laterality Date    APPENDECTOMY      last assessed: 10/21/2014    GALLBLADDER SURGERY      last assessed: 10/21/2014    HERNIA REPAIR      TUBAL LIGATION      last assessed: 2016       Family History   Problem Relation Age of Onset    Kidney disease Mother     Heart attack Father     No Known Problems Sister     No Known Problems Brother     No Known Problems Son     No Known Problems Daughter  No Known Problems Son     No Known Problems Son     No Known Problems Son     No Known Problems Son     No Known Problems Daughter     No Known Problems Daughter      I have reviewed and agree with the history as documented  E-Cigarette/Vaping    E-Cigarette Use Never User      E-Cigarette/Vaping Substances    Nicotine No     THC No     CBD No     Flavoring No     Other No     Unknown No      Social History     Tobacco Use    Smoking status: Never Smoker    Smokeless tobacco: Never Used   Vaping Use    Vaping Use: Never used   Substance Use Topics    Alcohol use: Never    Drug use: Never        Review of Systems   Constitutional: Negative for chills and fever  HENT: Negative for ear pain and sore throat  Eyes: Negative for pain and visual disturbance  Respiratory: Negative for cough and shortness of breath  Cardiovascular: Negative for chest pain and palpitations  Gastrointestinal: Negative for abdominal pain and vomiting  Genitourinary: Positive for vaginal pain  Negative for dysuria, hematuria, vaginal bleeding and vaginal discharge  Musculoskeletal: Negative for arthralgias and back pain  Skin: Negative for color change and rash  Neurological: Negative for seizures and syncope  All other systems reviewed and are negative  Physical Exam  ED Triage Vitals [07/15/21 1630]   Temperature Pulse Respirations Blood Pressure SpO2   98 5 °F (36 9 °C) 98 18 169/91 99 %      Temp src Heart Rate Source Patient Position - Orthostatic VS BP Location FiO2 (%)   -- -- Sitting Left arm --      Pain Score       --             Orthostatic Vital Signs  Vitals:    07/15/21 1630   BP: 169/91   Pulse: 98   Patient Position - Orthostatic VS: Sitting       Physical Exam  Vitals and nursing note reviewed  Exam conducted with a chaperone present  Constitutional:       General: She is not in acute distress  Appearance: She is well-developed     HENT:      Head: Normocephalic and atraumatic  Eyes:      Conjunctiva/sclera: Conjunctivae normal    Cardiovascular:      Rate and Rhythm: Normal rate and regular rhythm  Heart sounds: No murmur heard  Pulmonary:      Effort: Pulmonary effort is normal  No respiratory distress  Breath sounds: Normal breath sounds  Abdominal:      General: A surgical scar is present  Palpations: Abdomen is soft  Tenderness: There is no abdominal tenderness  There is no guarding or rebound  Comments: Scar on LLQ from hernia repair    Genitourinary:     General: Normal vulva  Comments: Speculum exam showed erythema in the vaginal canal with some mild thick, white discharge  Musculoskeletal:      Cervical back: Neck supple  Skin:     General: Skin is warm and dry  Neurological:      Mental Status: She is alert           ED Medications  Medications   fluconazole (DIFLUCAN) tablet 200 mg (200 mg Oral Given 7/15/21 1855)       Diagnostic Studies  Results Reviewed     Procedure Component Value Units Date/Time    Urinalysis with microscopic [[id number]]  (Abnormal) Collected: 07/15/21 1856    Lab Status: Final result Specimen: Urine, Clean Catch Updated: 07/15/21 1923     Clarity, UA Clear     Color, UA Colorless     Specific Gravity, UA 1 010     pH, UA 7 0     Glucose, UA Negative mg/dl      Ketones, UA Negative mg/dl      Blood, UA Trace-Intact     Protein, UA Negative mg/dl      Nitrite, UA Negative     Bilirubin, UA Negative     Urobilinogen, UA 0 2 E U /dl      Leukocytes, UA Negative     WBC, UA 0-1 /hpf      RBC, UA 0-1 /hpf      Bacteria, UA Occasional /hpf      Epithelial Cells Occasional /hpf                  No orders to display         Procedures  Procedures      ED Course                                       MDM  Number of Diagnoses or Management Options  Vaginal yeast infection: new and does not require workup  Diagnosis management comments: 60year old F with no pmhx presents for 10 days of vaginal burning after using a douche  Her speculum exam showed erythema and white discharge  Will treat for vaginal yeast infection  Pt received one dose of oral fluconazole in ED and was rx'd topical clotrimazole for 7 days  Pt already has appt with GYN scheduled for 7/28 and she was advised to keep this appointment and follow up at that time  UA negative for infection  Return precautions discussed including increased vaginal discharge, vaginal bleeding, abdominal pain, fevers, or any other concerning symptoms  Amount and/or Complexity of Data Reviewed  Clinical lab tests: ordered and reviewed    Risk of Complications, Morbidity, and/or Mortality  Presenting problems: moderate  Diagnostic procedures: low  Management options: low    Patient Progress  Patient progress: stable      Disposition  Final diagnoses:   Vaginal yeast infection     Time reflects when diagnosis was documented in both MDM as applicable and the Disposition within this note     Time User Action Codes Description Comment    7/15/2021  6:32 PM Valeria Ray Add [B37 3] Vaginal yeast infection       ED Disposition     ED Disposition Condition Date/Time Comment    Discharge Stable Thu Jul 15, 2021  6:32 PM Kamila Fung discharge to home/self care  Follow-up Information     Follow up With Specialties Details Why Contact Info Additional Information    [facility] Emergency Medicine  As discussed, please return for worsening vaginal discharge, vaginal bleeding, development of abdominal pain or fevers, or any other concerning symptoms   [address] [facility] [address] [facility] Emergency Department, [address], [place], [place], [address] and Delivery Obstetrics and Gynecology  As discussed, please keep your scheduled OBGYN appointment at [place] [address] for 7/28 [address] [facility] [address] and Delivery [phone]          Discharge Medication List as of 7/15/2021  6:38 PM      START taking these medications    Details   clotrimazole (GYNE-LOTRIMIN) 1 % vaginal cream Insert 1 applicator into the vagina daily at bedtime for 7 days, Starting Thu 7/15/2021, Until Thu 7/22/2021, Normal         CONTINUE these medications which have NOT CHANGED    Details   GAS RELIEF 125 MG CAPS TAKE 1 CAPSULE BY MOUTH EVERY 6 HOURS AS NEEDED FOR FLATULENCE( ABDOMINAL BLOATING), Normal      hydrochlorothiazide (HYDRODIURIL) 12 5 mg tablet TAKE 1 TABLET(12 5 MG) BY MOUTH DAILY, Normal           No discharge procedures on file  PDMP Review     None           ED Provider  Attending physically available and evaluated Sue Angulo I managed the patient along with the ED Attending      Electronically Signed by         Phuc Guevara MD  07/15/21 1950

## 2021-07-27 NOTE — PROGRESS NOTES
Assessment/Plan:  Emergency room follow-up-    No evidence of infection or Estrogen deficiency-  Numerous superficial cells present  Probably well estrogenized due to estrogen from adipose tissue  BMI 52  Rectocele-  Obviously old but patient was never notified in the past- explained  History of endometrial polyp- 2 nl EMB's   chart reviewed for the past 3 years  Mammography scheduled for 8/18/21  Colonoscopy- never       6/20  The endometrial thickness is between 11 and 12 mm and there is a hypervascular hyperechoic nodule measuring 11 x 6 x 6 mm suspicious for polyp  Tissue sampling is recommended  7/20  A  Endometrium, biopsy:  - Benign inactive/atrophic endometrium with benign metaplastic changes, negative for hyperplasia or carcinoma  4/18  B  Endometrium, biopsy:  Inactive endometrium with ciliated cell metaplasia  Scant benign endocervical tissue  RTO 2 months- routine annual visit  Diagnoses and all orders for this visit:    Vaginal burning  -     POCT wet mount    Rectocele    Grand multiparity    Abnormal ultrasound of endometrium    Endometrial polyp    BMI 45 0-49 9, adult (HCC)    Essential hypertension    Fatty liver    Other orders  -     conjugated estrogens (PREMARIN) 0 625 mg tablet; Take 0 625 mg by mouth daily Take daily for 21 days then do not take for 7 days  Subjective:        Patient ID: Lorene Dorantes is a 60 y o  female  HPI   Ms  Lore He presents today for follow-up from an emergency room visit on July 15  The notes state:    "59 yo F with no PMHx presents for 10 days of vaginal burning  Pt reports she used an apple cider vinegar douche 10 days ago for vaginal dryness and has had burning pain since then  Pt was rx'd premarin vaginal cream at an ER in [place] at the start of her sxs without relief  Pt denies vaginal bleeding, vaginal discharge, fevers, abdominal pain, n/v/d, and dysuria    #[id number] "    Patient reports that four years ago she was diagnosed with endometrial polyps and has been getting biopsies routinely since then  About a month ago, she wanted to avoid getting a repeat biopsy so she used vinegar in a douche to "clean out" her vagina  This caused significant burning and irritation  While she was on a vacation in [place], this burning became so severe that she went to a doctor there  They told her that she had "burned herself" with the vinegar and gave her Premarin cream  She notes that this did not relieve her pain which led to an ED visit at [facility] when she returned from her trip  At that time, they told her that she had acquired a yeast infection and gave her an antifungal cream  Patient notes that this resolved her symptoms and she has not had any discomfort or burning since  She was instructed to report to a gynecologist      She denies vaginal itching and burning  Her LMP in  but she had vaginal bleeding after her last biopsy  She has not had any vaginal bleeding or spotting since then  She is not currently sexually active  She is here today for an ED follow up and to establish care  She is a   All of her deliveries were vaginal and she denies any complications with her pregnancies  She was last seen at [facility] gynecologic clinic in September of last year for follow-up of an endometrial polyp diagnosed from an ultrasound which was performed for pelvic pain which resolved spontaneously in   She never had any vaginal bleeding which would have been postmenopausal but eventually underwent 2 endometrial biopsies  There was no hyperplasia nor malignancy present  The following portions of the patient's history were reviewed and updated as appropriate: She  has a past medical history of Breast lump, Essential hypertension (2020), Hemorrhoid, History of varicose veins, Irritable bowel syndrome, and  (spontaneous vaginal delivery)    Patient Active Problem List    Diagnosis Date Noted    Chronic RUQ pain 08/20/2020    Essential hypertension 08/20/2020    Fatty liver 08/20/2020    Class 3 severe obesity due to excess calories without serious comorbidity with body mass index (BMI) of 45 0 to 49 9 in adult [facility]) 09/17/2019    Visit for screening mammogram 09/05/2018    Abnormal female pelvic exam 04/16/2018    Endometrial polyp 04/13/2018    Varicose veins of lower extremity 01/18/2017   PMH:  Menarche 13  G8, P8; SAVD x 8  LTL  Cholecystectomy  HTN '19  Morbid Obesity  R Vulvar Bx 4/16- Melanotic macule  Left abdominal wall herniorrhaphy  Menopause 51 '16  Pelvic Pain 3/18 - US which led to concern of endometrium, suggestion of polyp                             - EMB 4/18 nl, HSC/D&C scheduled then cancelled 5/18                             - repeat US w resolution of hydrosalpinx but stable endometrium                             - EMB 7/20- nl, consideration for HSC/D&C ended [no pelvic pain, no                               PMB]     ED- Yeast 7/15/21  Rectocele - 7/21   Varicose vein surgery planned for the near future  She  has a past surgical history that includes Appendectomy; Gallbladder surgery; Hernia repair; and Tubal ligation  Her family history includes Heart attack in her father; Kidney disease in her mother; No Known Problems in her brother, daughter, daughter, daughter, sister, son, son, son, son, and son  She  reports that she has never smoked  She has never used smokeless tobacco  She reports that she does not drink alcohol and does not use drugs  SH:   13, father of her 8 children- he left when she was 30 [ considered her too old]  Remarried '18  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 [email or web address] 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  She was born in [facility] and moved to the [place] in [address] [place] [address]    Current Outpatient Medications   Medication Sig Dispense Refill    conjugated estrogens (PREMARIN) 0 625 mg tablet Take 0 625 mg by mouth daily Take daily for 21 days then do not take for 7 days   GAS RELIEF 125 MG CAPS TAKE 1 CAPSULE BY MOUTH EVERY 6 HOURS AS NEEDED FOR FLATULENCE( ABDOMINAL BLOATING) (Patient not taking: Reported on 4/21/2021) 28 capsule 0    hydrochlorothiazide (HYDRODIURIL) 12 5 mg tablet TAKE 1 TABLET(12 5 MG) BY MOUTH DAILY 90 tablet 0     No current facility-administered medications for this visit  Current Outpatient Medications on File Prior to Visit   Medication Sig    conjugated estrogens (PREMARIN) 0 625 mg tablet Take 0 625 mg by mouth daily Take daily for 21 days then do not take for 7 days   GAS RELIEF 125 MG CAPS TAKE 1 CAPSULE BY MOUTH EVERY 6 HOURS AS NEEDED FOR FLATULENCE( ABDOMINAL BLOATING) (Patient not taking: Reported on 4/21/2021)    hydrochlorothiazide (HYDRODIURIL) 12 5 mg tablet TAKE 1 TABLET(12 5 MG) BY MOUTH DAILY     No current facility-administered medications on file prior to visit  She has No Known Allergies       Review of Systems   Constitutional: Negative for activity change, appetite change, fatigue and unexpected weight change  Eyes: Negative for visual disturbance  Respiratory: Negative for cough, chest tightness, shortness of breath and wheezing  Cardiovascular: Negative for chest pain, palpitations and leg swelling  Breast: Patient denies tenderness, nipple discharge, masses, or erythema  Gastrointestinal: Negative for abdominal distention, abdominal pain, blood in stool, constipation, diarrhea, nausea and vomiting  Endocrine: Negative for cold intolerance and heat intolerance  Genitourinary: Negative for decreased urine volume, difficulty urinating, dyspareunia, dysuria, frequency, hematuria, menstrual problem, pelvic pain, urgency, vaginal bleeding, vaginal discharge and vaginal pain  [place] is infrequent just because they are not interested   She denies any incontinence Musculoskeletal: Negative for arthralgias  Skin: Negative for rash  Neurological: Negative for weakness, light-headedness, numbness and headaches  Hematological: Does not bruise/bleed easily  Psychiatric/Behavioral: Negative for agitation, behavioral problems and sleep disturbance  The patient is not nervous/anxious  Objective:    Vitals:    07/28/21 0919   BP: 120/80   Weight: 129 kg (283 lb 12 8 oz)            Physical Exam  Vitals reviewed  Exam conducted with a chaperone present  Constitutional:       Appearance: Normal appearance  HENT:      Head: Normocephalic and atraumatic  Eyes:      General: No scleral icterus  Right eye: No discharge  Left eye: No discharge  Extraocular Movements: Extraocular movements intact  Abdominal:      General: Abdomen is flat  There is no distension  Palpations: There is no mass  Tenderness: There is no abdominal tenderness  There is no right CVA tenderness, left CVA tenderness or guarding  Genitourinary:     General: Normal vulva  Exam position: Supine  Urethra: No urethral lesion  Vagina: Signs of injury present  Prolapsed vaginal walls present  No vaginal discharge or lesions  Cervix: Normal       Uterus: Normal        Adnexa: Right adnexa normal and left adnexa normal       Comments: The introitus is wide due to obstetrical trauma  There is a moderate rectocele with lateral wall relaxation as well  Vaginal wall rugation is normal   There is no discharge  a wet mount was performed which is normal   Superficial cells were present  Musculoskeletal:      Comments:  Bilateral lower leg varicosities  Skin:     General: Skin is warm and dry  Neurological:      Mental Status: She is alert and oriented to person, place, and time  Psychiatric:         Mood and Affect: Mood normal          Behavior: Behavior normal          Thought Content:  Thought content normal          Judgment: Judgment normal

## 2021-07-28 ENCOUNTER — OFFICE VISIT (OUTPATIENT)
Dept: OBGYN CLINIC | Facility: CLINIC | Age: 56
End: 2021-07-28

## 2021-07-28 VITALS — DIASTOLIC BLOOD PRESSURE: 80 MMHG | SYSTOLIC BLOOD PRESSURE: 120 MMHG | BODY MASS INDEX: 47.23 KG/M2 | WEIGHT: 283.8 LBS

## 2021-07-28 DIAGNOSIS — R93.5 ABNORMAL ULTRASOUND OF ENDOMETRIUM: ICD-10-CM

## 2021-07-28 DIAGNOSIS — N81.6 RECTOCELE: ICD-10-CM

## 2021-07-28 DIAGNOSIS — N94.9 VAGINAL BURNING: Primary | ICD-10-CM

## 2021-07-28 DIAGNOSIS — K76.0 FATTY LIVER: ICD-10-CM

## 2021-07-28 DIAGNOSIS — N84.0 ENDOMETRIAL POLYP: ICD-10-CM

## 2021-07-28 DIAGNOSIS — Z64.1 GRAND MULTIPARITY: ICD-10-CM

## 2021-07-28 DIAGNOSIS — I10 ESSENTIAL HYPERTENSION: ICD-10-CM

## 2021-07-28 LAB
BV WHIFF TEST VAG QL: NEGATIVE
CLUE CELLS SPEC QL WET PREP: NEGATIVE
PH SMN: NORMAL [PH]
SL AMB POCT WET MOUNT: NORMAL
T VAGINALIS VAG QL WET PREP: NEGATIVE
YEAST VAG QL WET PREP: NEGATIVE

## 2021-07-28 PROCEDURE — 99203 OFFICE O/P NEW LOW 30 MIN: CPT | Performed by: OBSTETRICS & GYNECOLOGY

## 2021-07-28 PROCEDURE — 87210 SMEAR WET MOUNT SALINE/INK: CPT | Performed by: OBSTETRICS & GYNECOLOGY

## 2021-08-18 ENCOUNTER — HOSPITAL ENCOUNTER (OUTPATIENT)
Dept: MAMMOGRAPHY | Facility: HOSPITAL | Age: 56
Discharge: HOME | End: 2021-08-18
Payer: COMMERCIAL

## 2021-08-18 VITALS — WEIGHT: 283 LBS | HEIGHT: 65 IN | BODY MASS INDEX: 47.15 KG/M2

## 2021-08-18 DIAGNOSIS — Z12.31 ENCOUNTER FOR SCREENING MAMMOGRAM FOR MALIGNANT NEOPLASM OF BREAST: ICD-10-CM

## 2021-08-18 PROCEDURE — 77067 SCR MAMMO BI INCL CAD: CPT

## 2021-08-18 PROCEDURE — 77063 BREAST TOMOSYNTHESIS BI: CPT

## 2021-09-28 PROBLEM — N81.6 RECTOCELE: Status: ACTIVE | Noted: 2021-09-28

## 2021-09-28 PROBLEM — Z01.419 ENCOUNTER FOR ANNUAL ROUTINE GYNECOLOGICAL EXAMINATION: Status: ACTIVE | Noted: 2021-09-28

## 2021-09-28 PROBLEM — Z12.31 ENCOUNTER FOR SCREENING MAMMOGRAM FOR BREAST CANCER: Status: ACTIVE | Noted: 2021-09-28

## 2021-09-29 ENCOUNTER — OFFICE VISIT (OUTPATIENT)
Dept: OBGYN CLINIC | Facility: CLINIC | Age: 56
End: 2021-09-29

## 2021-09-29 VITALS
WEIGHT: 284.4 LBS | SYSTOLIC BLOOD PRESSURE: 140 MMHG | DIASTOLIC BLOOD PRESSURE: 80 MMHG | HEIGHT: 65 IN | BODY MASS INDEX: 47.38 KG/M2

## 2021-09-29 DIAGNOSIS — N81.6 RECTOCELE: ICD-10-CM

## 2021-09-29 DIAGNOSIS — Z12.11 SCREENING FOR COLON CANCER: ICD-10-CM

## 2021-09-29 DIAGNOSIS — Z12.4 SCREENING FOR CERVICAL CANCER: ICD-10-CM

## 2021-09-29 DIAGNOSIS — Z12.31 ENCOUNTER FOR SCREENING MAMMOGRAM FOR BREAST CANCER: ICD-10-CM

## 2021-09-29 DIAGNOSIS — Z01.419 ENCOUNTER FOR ANNUAL ROUTINE GYNECOLOGICAL EXAMINATION: Primary | ICD-10-CM

## 2021-09-29 PROCEDURE — 99396 PREV VISIT EST AGE 40-64: CPT | Performed by: OBSTETRICS & GYNECOLOGY

## 2021-09-29 PROCEDURE — G0143 SCR C/V CYTO,THINLAYER,RESCR: HCPCS | Performed by: OBSTETRICS & GYNECOLOGY

## 2021-09-29 PROCEDURE — G0476 HPV COMBO ASSAY CA SCREEN: HCPCS | Performed by: OBSTETRICS & GYNECOLOGY

## 2021-10-01 LAB
HPV HR 12 DNA CVX QL NAA+PROBE: NEGATIVE
HPV16 DNA CVX QL NAA+PROBE: NEGATIVE
HPV18 DNA CVX QL NAA+PROBE: NEGATIVE

## 2021-10-06 ENCOUNTER — TELEPHONE (OUTPATIENT)
Dept: OBGYN CLINIC | Facility: CLINIC | Age: 56
End: 2021-10-06

## 2021-10-06 LAB
LAB AP GYN PRIMARY INTERPRETATION: NORMAL
Lab: NORMAL

## 2021-10-20 ENCOUNTER — OFFICE VISIT (OUTPATIENT)
Dept: INTERNAL MEDICINE CLINIC | Facility: CLINIC | Age: 56
End: 2021-10-20

## 2021-10-20 VITALS
TEMPERATURE: 97.8 F | OXYGEN SATURATION: 97 % | DIASTOLIC BLOOD PRESSURE: 80 MMHG | SYSTOLIC BLOOD PRESSURE: 111 MMHG | WEIGHT: 286.6 LBS | BODY MASS INDEX: 47.69 KG/M2 | HEART RATE: 86 BPM

## 2021-10-20 DIAGNOSIS — Z87.19 HISTORY OF VENTRAL HERNIA REPAIR: ICD-10-CM

## 2021-10-20 DIAGNOSIS — R10.32 LLQ PAIN: Primary | ICD-10-CM

## 2021-10-20 DIAGNOSIS — Z98.890 HISTORY OF VENTRAL HERNIA REPAIR: ICD-10-CM

## 2021-10-20 DIAGNOSIS — K59.09 OTHER CONSTIPATION: ICD-10-CM

## 2021-10-20 DIAGNOSIS — Z23 NEED FOR INFLUENZA VACCINATION: ICD-10-CM

## 2021-10-20 PROCEDURE — 99213 OFFICE O/P EST LOW 20 MIN: CPT | Performed by: PHYSICIAN ASSISTANT

## 2021-10-20 PROCEDURE — 90471 IMMUNIZATION ADMIN: CPT | Performed by: PHYSICIAN ASSISTANT

## 2021-10-20 PROCEDURE — 90682 RIV4 VACC RECOMBINANT DNA IM: CPT | Performed by: PHYSICIAN ASSISTANT

## 2022-02-16 ENCOUNTER — CONSULT (OUTPATIENT)
Dept: GASTROENTEROLOGY | Facility: CLINIC | Age: 57
End: 2022-02-16

## 2022-02-16 VITALS
WEIGHT: 285 LBS | BODY MASS INDEX: 47.48 KG/M2 | RESPIRATION RATE: 18 BRPM | HEIGHT: 65 IN | SYSTOLIC BLOOD PRESSURE: 128 MMHG | TEMPERATURE: 99 F | OXYGEN SATURATION: 97 % | DIASTOLIC BLOOD PRESSURE: 78 MMHG | HEART RATE: 88 BPM

## 2022-02-16 DIAGNOSIS — Z12.11 COLON CANCER SCREENING: ICD-10-CM

## 2022-02-16 DIAGNOSIS — Z00.00 HEALTHCARE MAINTENANCE: ICD-10-CM

## 2022-02-16 DIAGNOSIS — E66.01 CLASS 3 SEVERE OBESITY DUE TO EXCESS CALORIES WITHOUT SERIOUS COMORBIDITY WITH BODY MASS INDEX (BMI) OF 45.0 TO 49.9 IN ADULT (HCC): Primary | ICD-10-CM

## 2022-02-16 PROCEDURE — 99244 OFF/OP CNSLTJ NEW/EST MOD 40: CPT | Performed by: INTERNAL MEDICINE

## 2022-02-16 NOTE — PROGRESS NOTES
[facility] - Outpatient Consultation  Chance Guerrero 60 y o  female MRN: [id number]  Encounter: [id number]    ASSESSMENT AND PLAN:    Chon Carbajal is a 60 y o  old female with PMH: varicose veins, morbid obesity, who presents for:    #Screening Colonoscopy  Patient to undergo age appropriate screening colonoscopy  Patient currently not diabetic and not on any blood thinners, antiplatelet agents  Patient at average risk for colon cancer with no high risk features endorsed  Patient has never had any previous colon cancer screening (invasive or non-invasive)  Has tolerated conscious sedation well previously  Because of her obesity will plan for doing the procedure at [facility]  Plan:  -Currently average risk for colonoscopy, other options for colorectal cancer screening were discussed with the patient, will perform colonoscopy, risk and benefits of the procedure were discussed with the patient including but not limited to bleeding, infection, perforation and missing an adenoma   -colonoscopy ordered today    #Obesity  Patient with BMI up to 47  Patient encouraged to get on exercise regimen  Can consider referral to weight loss management team     Plan:  -recommended at least 30 minutes of 5x weekly exercise  -asked patient to keep food journal and calorie count, encourage using phone apps  -underwent teaching session on reading food labels focusing on sugar and fat content  -educated patient on healthy dietary changes:   -recommended decreasing soda consumption with plan to stop or switch to diet   -increased vegetable intake   -increased fiber intake   -abstain from fried foods    #Intermittent Hemorrhoids   High fiber diet  Avoid constipation  Ensure adequate hydration    ______________________________________________________________________    HPI:    Patient reffered by Melanie Damon PA-C       Patient presented GI clinic in the setting of colon cancer screening  Patient with no other acute complaints at this time states she is feeling well overall  No other GI complaints  Patient denies any hematemesis, melena, or hematochezia  Does not endorse any weight loss, recent N/V/F/C  Denies any change in bowel habits, no new constipation or diarrhea  Patient denies any significant use of NSAIDs, alcohol abuse, tobacco use  Of note patient has 8 children  States that after the birth of her 3rd child she did have some hemorrhoids which intermittently cause issues but have not cause issues in many years now  No associated bleeding  Family history:  No FH of IBD, Colon Cancer     Last EGD: Never  Last colonoscopy: Never    REVIEW OF SYSTEMS:    CONSTITUTIONAL: Denies any fever, chills, rigors, and weight loss  HEENT: No earache or tinnitus  Denies hearing loss or visual disturbances  CARDIOVASCULAR: No chest pain or palpitations  RESPIRATORY: Denies any cough, hemoptysis, shortness of breath or dyspnea on exertion  GASTROINTESTINAL: As noted in the History of Present Illness  GENITOURINARY: No problems with urination  Denies any hematuria or dysuria  NEUROLOGIC: No dizziness or vertigo, denies headaches  MUSCULOSKELETAL: Denies any muscle or joint pain  SKIN: Denies skin rashes or itching  ENDOCRINE: Denies excessive thirst  Denies intolerance to heat or cold  PSYCHOSOCIAL: Denies depression or anxiety  Denies any recent memory loss       Historical Information   Past Medical History:   Diagnosis Date    Breast lump     last assessed: 2014    Essential hypertension 2020    Hemorrhoid     History of varicose veins     last assessed: 2017    Irritable bowel syndrome     last assessed: 2012     (spontaneous vaginal delivery)     last assessed: 2016     Past Surgical History:   Procedure Laterality Date    APPENDECTOMY      last assessed: 10/21/2014    GALLBLADDER SURGERY      last assessed: 10/21/2014  HERNIA REPAIR      TUBAL LIGATION      last assessed: 05/12/2016     Social History   Social History     Substance and Sexual Activity   Alcohol Use Never     Social History     Substance and Sexual Activity   Drug Use Never     Social History     Tobacco Use   Smoking Status Never Smoker   Smokeless Tobacco Never Used     Family History   Problem Relation Age of Onset    Kidney disease Mother     Heart attack Father     No Known Problems Sister     No Known Problems Brother     No Known Problems Son     No Known Problems Daughter     No Known Problems Sister     No Known Problems Sister     No Known Problems Sister     No Known Problems Son     No Known Problems Son     No Known Problems Son     No Known Problems Son     No Known Problems Daughter     No Known Problems Daughter        Meds/Allergies       Current Outpatient Medications:     conjugated estrogens (PREMARIN) 0 625 mg tablet    GAS RELIEF 125 MG CAPS    hydrochlorothiazide (HYDRODIURIL) 12 5 mg tablet  No Known Allergies    Objective     Blood pressure 128/78, pulse 88, temperature 99 °F (37 2 °C), temperature source Tympanic, resp  rate 18, height 5' 5" (1 651 m), weight 129 kg (285 lb), SpO2 97 %, not currently breastfeeding  Body mass index is 47 43 kg/m²  PHYSICAL EXAM:      General Appearance:   Well-appearing obese female who is alert, cooperative, no distress   HEENT:   Normocephalic, atraumatic, anicteric   Neck:  Supple, symmetrical, trachea midline   Lungs:   Clear to auscultation bilaterally; no rales, rhonchi or wheezing; respirations unlabored    Heart:   Regular rate and rhythm; no murmur, rub, or gallop     Abdomen:   Soft, non-tender, non-distended; normal bowel sounds; no masses, no organomegaly    Genitalia:   Deferred    Rectal:   Deferred    Extremities:  No cyanosis, clubbing or edema    Pulses:  2+ and symmetric    Skin:  No jaundice, rashes, or lesions    Lymph nodes:  No palpable cervical lymphadenopathy Lab Results:   No visits with results within 1 Day(s) from this visit  Latest known visit with results is:   Office Visit on 09/29/2021   Component Date Value    Case Report 09/29/2021                      Value:Gynecologic Cytology Report                       Case: Alvina Aggarwal Provider:  Ubaldo Bolaños MD          Collected:           09/29/2021 1133              Ordering Location:     [facility]:            09/29/2021 1133                                     [facility]                                                                               [facility] Screen:          KATELYNN Solorzano                                                         Specimen:    LIQUID-BASED PAP, SCREENING, Cervix                                                        Primary Interpretation 09/29/2021 Negative for intraepithelial lesion or malignancy     Specimen Adequacy 09/29/2021 Satisfactory for evaluation  Endocervical/transformation zone component present   Note 09/29/2021                      Value: This result contains rich text formatting which cannot be displayed here   Additional Information 09/29/2021                      Value: This result contains rich text formatting which cannot be displayed here      HPV Other HR 09/29/2021 Negative     HPV16 09/29/2021 Negative     HPV18 09/29/2021 Negative        Radiology Results:   No results found     ---------------------------------------------------  Note Electronically Signed By:    MD [facility] PGY-[id number]  PI #: [id number]

## 2022-02-16 NOTE — PATIENT INSTRUCTIONS
Scheduled date of colonoscopy (as of today):  5/5/22  Physician performing colonoscopy: Dr Richard Gan  Location of colonoscopy: [facility]   Bowel prep reviewed with patient: miralax/mag citrate  Instructions reviewed with patient by: Fidelia SINHA  Clearances:  n/a

## 2022-02-22 ENCOUNTER — TELEPHONE (OUTPATIENT)
Dept: GASTROENTEROLOGY | Facility: CLINIC | Age: 57
End: 2022-02-22

## 2022-02-22 NOTE — TELEPHONE ENCOUNTER
Pt called in requesting a Amharic call back  Called in with the interprerter line and went it straight to   VM was not set up, therefore I was unable to leave a message

## 2022-05-03 ENCOUNTER — TELEPHONE (OUTPATIENT)
Dept: GASTROENTEROLOGY | Facility: CLINIC | Age: 57
End: 2022-05-03

## 2022-05-03 NOTE — TELEPHONE ENCOUNTER
Pt calling asking if someone Ukrainian can giver time for her procedure as pt does not speak english very well

## 2022-05-05 ENCOUNTER — ANESTHESIA EVENT (OUTPATIENT)
Dept: GASTROENTEROLOGY | Facility: HOSPITAL | Age: 57
End: 2022-05-05

## 2022-05-05 ENCOUNTER — ANESTHESIA (OUTPATIENT)
Dept: GASTROENTEROLOGY | Facility: HOSPITAL | Age: 57
End: 2022-05-05

## 2022-05-05 ENCOUNTER — HOSPITAL ENCOUNTER (OUTPATIENT)
Dept: GASTROENTEROLOGY | Facility: HOSPITAL | Age: 57
Setting detail: OUTPATIENT SURGERY
Discharge: HOME | End: 2022-05-05
Attending: INTERNAL MEDICINE | Admitting: INTERNAL MEDICINE
Payer: COMMERCIAL

## 2022-05-05 VITALS
SYSTOLIC BLOOD PRESSURE: 141 MMHG | OXYGEN SATURATION: 97 % | HEART RATE: 75 BPM | TEMPERATURE: 97 F | RESPIRATION RATE: 16 BRPM | DIASTOLIC BLOOD PRESSURE: 62 MMHG

## 2022-05-05 DIAGNOSIS — Z12.11 COLON CANCER SCREENING: ICD-10-CM

## 2022-05-05 DIAGNOSIS — Z00.00 HEALTHCARE MAINTENANCE: ICD-10-CM

## 2022-05-05 PROCEDURE — 88305 TISSUE EXAM BY PATHOLOGIST: CPT | Performed by: PATHOLOGY

## 2022-05-05 PROCEDURE — 45385 COLONOSCOPY W/LESION REMOVAL: CPT | Performed by: INTERNAL MEDICINE

## 2022-05-05 RX ORDER — PROPOFOL 10 MG/ML
INJECTION, EMULSION INTRAVENOUS AS NEEDED
Status: DISCONTINUED | OUTPATIENT
Start: 2022-05-05 | End: 2022-05-05

## 2022-05-05 RX ORDER — PROPOFOL 10 MG/ML
INJECTION, EMULSION INTRAVENOUS CONTINUOUS PRN
Status: DISCONTINUED | OUTPATIENT
Start: 2022-05-05 | End: 2022-05-05

## 2022-05-05 RX ORDER — SODIUM CHLORIDE 9 MG/ML
INJECTION, SOLUTION INTRAVENOUS CONTINUOUS PRN
Status: DISCONTINUED | OUTPATIENT
Start: 2022-05-05 | End: 2022-05-05

## 2022-05-05 RX ADMIN — PROPOFOL 50 MG: 10 INJECTION, EMULSION INTRAVENOUS at 09:44

## 2022-05-05 RX ADMIN — PROPOFOL 120 MCG/KG/MIN: 10 INJECTION, EMULSION INTRAVENOUS at 09:40

## 2022-05-05 RX ADMIN — PROPOFOL 100 MG: 10 INJECTION, EMULSION INTRAVENOUS at 09:40

## 2022-05-05 RX ADMIN — SODIUM CHLORIDE: 0.9 INJECTION, SOLUTION INTRAVENOUS at 09:36

## 2022-05-05 NOTE — H&P
History and Physical - [facility]  Anup Beckerbrianda Markstamiko 59 y o  female MRN: [id number]                  HPI: Amairani Atkins is a 59y o  year old female who presents for colonoscopy for colon cancer screening  No previous colonoscopy  REVIEW OF SYSTEMS: Per the HPI, and otherwise unremarkable  Historical Information   Past Medical History:   Diagnosis Date    Anxiety     Breast lump     last assessed: 2014    Essential hypertension 2020    Hemorrhoid     History of varicose veins     last assessed: 2017    Irritable bowel syndrome     last assessed: 2012     (spontaneous vaginal delivery)     last assessed: 2016     Past Surgical History:   Procedure Laterality Date    APPENDECTOMY      last assessed: 10/21/2014    GALLBLADDER SURGERY      last assessed: 10/21/2014    HERNIA REPAIR      TUBAL LIGATION      last assessed: 2016     Social History   Social History     Substance and Sexual Activity   Alcohol Use Never     Social History     Substance and Sexual Activity   Drug Use Never     Social History     Tobacco Use   Smoking Status Never Smoker   Smokeless Tobacco Never Used     Family History   Problem Relation Age of Onset    Kidney disease Mother     Heart attack Father     No Known Problems Sister     No Known Problems Brother     No Known Problems Son     No Known Problems Daughter     No Known Problems Sister     No Known Problems Sister     No Known Problems Sister     No Known Problems Son     No Known Problems Son     No Known Problems Son     No Known Problems Son     No Known Problems Daughter     No Known Problems Daughter        Meds/Allergies       Current Outpatient Medications:     conjugated estrogens (PREMARIN) 0 625 mg tablet    GAS RELIEF 125 MG CAPS    hydrochlorothiazide (HYDRODIURIL) 12 5 mg tablet  No current facility-administered medications for this encounter      No Known Allergies    Objective     /80   Pulse 74   Temp 98 1 °F (36 7 °C) (Tympanic)   Resp 16   LMP  (LMP Unknown)   SpO2 97%       PHYSICAL EXAM    Gen: NAD  Head: NCAT  CV: RRR  CHEST: Clear  ABD: soft, NT/ND  EXT: no edema      ASSESSMENT/PLAN:   Chadwick Yousuf is a 59y o  year old female who presents for colonoscopy for colon cancer screening  No previous colonoscopy  The patient is stable and optimized for the procedure, we reviewed risk and benefits  Risk include but not limited to infection, bleeding, perforation and missing a lesion

## 2022-05-05 NOTE — ANESTHESIA POSTPROCEDURE EVALUATION
Post-Op Assessment Note    CV Status:  Stable       Mental Status:  Sleepy, arousable and lethargic   Hydration Status:  Stable   PONV Controlled:  None   Airway Patency:  Patent      Post Op Vitals Reviewed: Yes      Staff: Anesthesiologist, CRNA         No complications documented      BP   108/71   Temp      Pulse  80   Resp   18   SpO2   100

## 2022-05-05 NOTE — ANESTHESIA PREPROCEDURE EVALUATION
Procedure:  COLONOSCOPY    Relevant Problems   CARDIO   (+) Essential hypertension   (+) Varicose veins of lower extremity      GI/HEPATIC   (+) Fatty liver      Other   (+) BMI 45 0-49 9, adult (HCC)   (+) Class 3 severe obesity due to excess calories without serious comorbidity with body mass index (BMI) of 45 0 to 49 9 in adult [facility])      Adequately NPO  Good functional capacity  No prior anesthesia complications  Physical Exam    Airway    Mallampati score: III  TM Distance: >3 FB  Neck ROM: full     Dental       Cardiovascular  Rhythm: regular, Rate: normal,     Pulmonary  Pulmonary exam normal     Other Findings        Anesthesia Plan  ASA Score- 3     Anesthesia Type- IV sedation with anesthesia with ASA Monitors  Additional Monitors:   Airway Plan:     Comment: Spontaneous with supplemental O2  Plan Factors-Exercise tolerance (METS): >4 METS  Chart reviewed  Existing labs reviewed  Patient summary reviewed  Patient is not a current smoker  Obstructive sleep apnea risk education given perioperatively  Induction- intravenous  Postoperative Plan-     Informed Consent- Anesthetic plan and risks discussed with patient and spouse  I personally reviewed this patient with the CRNA  Discussed and agreed on the Anesthesia Plan with the CRNA  Zaid Bartholomew

## 2022-05-18 ENCOUNTER — TELEPHONE (OUTPATIENT)
Dept: GASTROENTEROLOGY | Facility: AMBULARY SURGERY CENTER | Age: 57
End: 2022-05-18

## 2022-05-18 NOTE — TELEPHONE ENCOUNTER
I returned pts call with  services [phone] we tried 3x goes right to [address] [place] [address] Road, MD   5/18/2022 12:26 PM EDT         I attempted to call patient however voicemail box is not set up  Erma Rubinstein someone from our office please call patient and let her know that her colon polyps returned as precancerous polyp that was removed  Kareem Paredes colonoscopy in 3 years due to fair prep  Recall placed

## 2022-07-05 NOTE — TELEPHONE ENCOUNTER
I made attempt to reach patient lmom for pt to call  back and ask for  and do try to get MA on line, we have made several attempts to reach patient

## 2022-07-18 DIAGNOSIS — I10 ESSENTIAL HYPERTENSION: ICD-10-CM

## 2022-07-18 NOTE — TELEPHONE ENCOUNTER
Patient completely out  She wasn't feeling so well last night  She is feeling ok right now  Patient is schedule for a follow up on 8/5/22

## 2022-07-19 RX ORDER — HYDROCHLOROTHIAZIDE 12.5 MG/1
12.5 TABLET ORAL DAILY
Qty: 90 TABLET | Refills: 1 | Status: SHIPPED | OUTPATIENT
Start: 2022-07-19 | End: 2023-01-15

## 2022-08-05 ENCOUNTER — OFFICE VISIT (OUTPATIENT)
Dept: INTERNAL MEDICINE CLINIC | Facility: CLINIC | Age: 57
End: 2022-08-05

## 2022-08-05 VITALS
DIASTOLIC BLOOD PRESSURE: 81 MMHG | WEIGHT: 293 LBS | HEIGHT: 66 IN | OXYGEN SATURATION: 94 % | BODY MASS INDEX: 47.09 KG/M2 | HEART RATE: 71 BPM | TEMPERATURE: 98 F | SYSTOLIC BLOOD PRESSURE: 127 MMHG

## 2022-08-05 DIAGNOSIS — Z00.00 ANNUAL PHYSICAL EXAM: Primary | ICD-10-CM

## 2022-08-05 DIAGNOSIS — Z11.59 NEED FOR HEPATITIS C SCREENING TEST: ICD-10-CM

## 2022-08-05 DIAGNOSIS — E66.01 MORBID OBESITY WITH BMI OF 45.0-49.9, ADULT (HCC): ICD-10-CM

## 2022-08-05 DIAGNOSIS — Z12.31 BREAST CANCER SCREENING BY MAMMOGRAM: ICD-10-CM

## 2022-08-05 DIAGNOSIS — I10 ESSENTIAL HYPERTENSION: Chronic | ICD-10-CM

## 2022-08-05 PROCEDURE — 99396 PREV VISIT EST AGE 40-64: CPT | Performed by: HOSPITALIST

## 2022-08-05 NOTE — PROGRESS NOTES
[address] [place] [address] [address]    NAME: Chas Guerrero  AGE: 59 y o  SEX: female  : 1965     DATE: 2022     Assessment and Plan:     Problem List Items Addressed This Visit        Cardiovascular and Mediastinum    Essential hypertension - Blood pressure in good control  She stopped taking her medication 2 days ago  Counseled her on risks of stopping abruptly and adviced on starting it again  Other Visit Diagnoses     Encounter for annual physical exam        Orders  · CBC and platelets, CMP, TSH Free T4, Lipid panel, Hba1c, Hep C antibody, Hep B  · Mammogram for Breast cancer screening as she is due soon  · Referral to nutrition services for morbit obesity management          Immunizations and preventive care screenings were discussed with patient today  Appropriate education was printed on patient's after visit summary  Counseling:  Alcohol/drug use: discussed moderation in alcohol intake, the recommendations for healthy alcohol use, and avoidance of illicit drug use  Dental Health: discussed importance of regular tooth brushing, flossing, and dental visits  · Exercise: the importance of regular exercise/physical activity was discussed  Recommend exercise 3-5 times per week for at least 30 minutes  Return in 3 months (on 2022)  Chief Complaint:     Chief Complaint   Patient presents with    Annual Exam      History of Present Illness:     Adult Annual Physical   Patient here for a comprehensive physical exam  The patient reports no problems  Diet and Physical Activity  · Diet/Nutrition: well balanced diet, limited junk food, consuming 3-5 servings of fruits/vegetables daily, adequate fiber intake and adequate whole grain intake  · Exercise: no formal exercise        Depression Screening  PHQ-2/9 Depression Screening    Little interest or pleasure in doing things: 0 - not at all  Feeling down, depressed, or hopeless: 0 - not at all  PHQ-2 Score: 0  PHQ-2 Interpretation: Negative depression screen       General Health  · Sleep: sleeps well and gets more than 8 hours of sleep on average  · Hearing: normal - bilateral   · Vision: wears glasses  · Dental: regular dental visits, brushes teeth twice daily and flosses teeth occasionally  /GYN Health  · Patient is: postmenopausal  · Last menstrual period: Years ago  · Contraceptive method: None  Review of Systems:     Review of Systems   Constitutional: Negative for activity change, appetite change, fatigue, fever and unexpected weight change  HENT: Negative for trouble swallowing and voice change  Eyes: Negative for pain and redness  Respiratory: Negative for cough, chest tightness and shortness of breath  Cardiovascular: Positive for leg swelling  Negative for chest pain and palpitations  Gastrointestinal: Negative for abdominal pain, anal bleeding, constipation and vomiting  Endocrine: Negative for polydipsia, polyphagia and polyuria  Genitourinary: Negative for difficulty urinating and dysuria  Musculoskeletal: Negative for arthralgias and back pain  Neurological: Negative for dizziness, tremors, syncope and headaches  Hematological: Does not bruise/bleed easily  Psychiatric/Behavioral: Negative for agitation, behavioral problems and confusion        Past Medical History:     Past Medical History:   Diagnosis Date    Anxiety     Breast lump     last assessed: 2014    Essential hypertension 2020    Hemorrhoid     History of varicose veins     last assessed: 2017    Irritable bowel syndrome     last assessed: 2012     (spontaneous vaginal delivery)     last assessed: 2016      Past Surgical History:     Past Surgical History:   Procedure Laterality Date    APPENDECTOMY      last assessed: 10/21/2014    GALLBLADDER SURGERY      last assessed: 10/21/2014    HERNIA REPAIR      TUBAL LIGATION      last assessed: 05/12/2016      Social History:     Social History     Socioeconomic History    Marital status:      Spouse name: None    Number of children: None    Years of education: None    Highest education level: None   Occupational History    None   Tobacco Use    Smoking status: Never Smoker    Smokeless tobacco: Never Used   Vaping Use    Vaping Use: Never used   Substance and Sexual Activity    Alcohol use: Never    Drug use: Never    Sexual activity: Not Currently     Partners: Male     Birth control/protection: Female Sterilization     Comment: tubal ligation surgery   Other Topics Concern    None   Social History Narrative    None     Social Determinants of Health     Financial Resource Strain: Low Risk     Difficulty of Paying Living Expenses: Not hard at all   Food Insecurity: No Food Insecurity    Worried About Running Out of Food in the Last Year: Never true    Harley of Food in the Last Year: Never true   Transportation Needs: No Transportation Needs    Lack of Transportation (Medical): No    Lack of Transportation (Non-Medical):  No   Physical Activity: Not on file   Stress: Not on file   Social Connections: Not on file   Intimate Partner Violence: Not on file   Housing Stability: Low Risk     Unable to Pay for Housing in the Last Year: No    Number of Places Lived in the Last Year: 1    Unstable Housing in the Last Year: No      Family History:     Family History   Problem Relation Age of Onset    Kidney disease Mother     Heart attack Father     No Known Problems Sister     No Known Problems Brother     No Known Problems Son     No Known Problems Daughter     No Known Problems Sister     No Known Problems Sister     No Known Problems Sister     No Known Problems Son     No Known Problems Son     No Known Problems Son     No Known Problems Son     No Known Problems Daughter     No Known Problems Daughter       Current Medications:     Current Outpatient Medications   Medication Sig Dispense Refill    hydrochlorothiazide (HYDRODIURIL) 12 5 mg tablet Take 1 tablet (12 5 mg total) by mouth daily (Patient not taking: Reported on 8/5/2022) 90 tablet 1     No current facility-administered medications for this visit  Allergies:     No Known Allergies   Physical Exam:     /81 (BP Location: Left arm, Patient Position: Sitting, Cuff Size: Large)   Pulse 71   Temp 98 °F (36 7 °C) (Temporal)   Ht 5' 6" (1 676 m)   Wt 135 kg (297 lb 3 2 oz)   LMP  (LMP Unknown)   SpO2 94%   BMI 47 97 kg/m²     Physical Exam  Constitutional:       General: She is awake  Appearance: Normal appearance  She is morbidly obese  HENT:      Head: Normocephalic  Eyes:      General: Lids are normal    Cardiovascular:      Rate and Rhythm: Normal rate and regular rhythm  Pulses: Normal pulses  Heart sounds: Normal heart sounds  Pulmonary:      Effort: Pulmonary effort is normal       Breath sounds: Normal breath sounds and air entry  Abdominal:      General: Abdomen is protuberant  There is no distension  Palpations: Abdomen is soft  Musculoskeletal:      Cervical back: Full passive range of motion without pain and normal range of motion  Right lower leg: No edema  Left lower leg: No edema  Skin:     General: Skin is warm  Capillary Refill: Capillary refill takes less than 2 seconds  Coloration: Skin is not jaundiced or pale  Neurological:      Mental Status: She is alert and oriented to person, place, and time  Psychiatric:         Attention and Perception: Attention normal          Mood and Affect: Mood normal          Speech: Speech normal          Behavior: Behavior is cooperative            Kirk Betancourt MD  [address]

## 2022-08-05 NOTE — PATIENT INSTRUCTIONS

## 2022-08-08 ENCOUNTER — APPOINTMENT (OUTPATIENT)
Dept: LAB | Facility: HOSPITAL | Age: 57
End: 2022-08-08

## 2022-08-08 DIAGNOSIS — Z11.59 NEED FOR HEPATITIS C SCREENING TEST: ICD-10-CM

## 2022-08-08 DIAGNOSIS — E66.01 MORBID OBESITY WITH BMI OF 45.0-49.9, ADULT (HCC): ICD-10-CM

## 2022-08-08 DIAGNOSIS — Z00.00 ANNUAL PHYSICAL EXAM: ICD-10-CM

## 2022-08-08 LAB
ALBUMIN SERPL BCP-MCNC: 3.3 G/DL (ref 3.5–5)
ALP SERPL-CCNC: 66 U/L (ref 46–116)
ALT SERPL W P-5'-P-CCNC: 37 U/L (ref 12–78)
ANION GAP SERPL CALCULATED.3IONS-SCNC: 4 MMOL/L (ref 4–13)
AST SERPL W P-5'-P-CCNC: 26 U/L (ref 5–45)
BILIRUB SERPL-MCNC: 0.87 MG/DL (ref 0.2–1)
BUN SERPL-MCNC: 14 MG/DL (ref 5–25)
CALCIUM ALBUM COR SERPL-MCNC: 8.9 MG/DL (ref 8.3–10.1)
CALCIUM SERPL-MCNC: 8.3 MG/DL (ref 8.3–10.1)
CHLORIDE SERPL-SCNC: 108 MMOL/L (ref 96–108)
CHOLEST SERPL-MCNC: 190 MG/DL
CO2 SERPL-SCNC: 29 MMOL/L (ref 21–32)
CREAT SERPL-MCNC: 0.67 MG/DL (ref 0.6–1.3)
ERYTHROCYTE [DISTWIDTH] IN BLOOD BY AUTOMATED COUNT: 12.5 % (ref 11.6–15.1)
EST. AVERAGE GLUCOSE BLD GHB EST-MCNC: 114 MG/DL
GFR SERPL CREATININE-BSD FRML MDRD: 96 ML/MIN/1.73SQ M
GLUCOSE P FAST SERPL-MCNC: 101 MG/DL (ref 65–99)
HBA1C MFR BLD: 5.6 %
HCT VFR BLD AUTO: 41.8 % (ref 34.8–46.1)
HCV AB SER QL: NORMAL
HDLC SERPL-MCNC: 70 MG/DL
HGB BLD-MCNC: 13.8 G/DL (ref 11.5–15.4)
LDLC SERPL CALC-MCNC: 101 MG/DL (ref 0–100)
MCH RBC QN AUTO: 30.1 PG (ref 26.8–34.3)
MCHC RBC AUTO-ENTMCNC: 33 G/DL (ref 31.4–37.4)
MCV RBC AUTO: 91 FL (ref 82–98)
NONHDLC SERPL-MCNC: 120 MG/DL
PLATELET # BLD AUTO: 280 THOUSANDS/UL (ref 149–390)
PMV BLD AUTO: 9.4 FL (ref 8.9–12.7)
POTASSIUM SERPL-SCNC: 4.1 MMOL/L (ref 3.5–5.3)
PROT SERPL-MCNC: 7.7 G/DL (ref 6.4–8.4)
RBC # BLD AUTO: 4.58 MILLION/UL (ref 3.81–5.12)
SODIUM SERPL-SCNC: 141 MMOL/L (ref 135–147)
TRIGL SERPL-MCNC: 93 MG/DL
TSH SERPL DL<=0.05 MIU/L-ACNC: 3.43 UIU/ML (ref 0.45–4.5)
WBC # BLD AUTO: 6.81 THOUSAND/UL (ref 4.31–10.16)

## 2022-08-08 PROCEDURE — 83036 HEMOGLOBIN GLYCOSYLATED A1C: CPT

## 2022-08-08 PROCEDURE — 36415 COLL VENOUS BLD VENIPUNCTURE: CPT

## 2022-08-08 PROCEDURE — 80061 LIPID PANEL: CPT

## 2022-08-08 PROCEDURE — 84443 ASSAY THYROID STIM HORMONE: CPT

## 2022-08-08 PROCEDURE — 80053 COMPREHEN METABOLIC PANEL: CPT

## 2022-08-08 PROCEDURE — 86803 HEPATITIS C AB TEST: CPT

## 2022-08-08 PROCEDURE — 85027 COMPLETE CBC AUTOMATED: CPT

## 2022-09-29 NOTE — PROGRESS NOTES
Assessment/Plan:  NGE                                                                                                               Rectocele '      Obesity - BMI 50  Co- Testing q 5 yrs  -                                                                              RTO 1 yr  SBA monthly  3 D Mammography - active order, scheduled for November  Colonoscopy  - Polyps, repeat   Exercise 3/wk   - has a visit with a nutritionist soon                Calcium 1,000 mg/d with Vit D - to add a supplement     Depression Screen: Neg       Diagnoses and all orders for this visit:    Encounter for annual routine gynecological examination    Encounter for screening mammogram for breast cancer    Rectocele    BMI 50 0-59 9, adult ([facility] [address] )              Subjective:        Patient ID: Megan Alcala is a 59 y o  female  Bennyyousif Christianson presents today for her yearly evaluation  An  was used  She has no gynecologic complaints  She has continued to gain weight  She lost her mother and 2 sisters this past year  They live in [facility]   She could not go to visit because of her residence status  She would not be able to return to the [place] if she left  She is on a work permit  She suffered from depression and ate more than usual   She is currently trying to lose weight and has an appointment with a nutritionist   She had a colonoscopy in May  There were polyps and she has a 3 year follow-up  She has a mammogram scheduled for November  The following portions of the patient's history were reviewed and updated as appropriate: She  has a past medical history of Anxiety, Breast lump, Essential hypertension (2020), Hemorrhoid, History of varicose veins, Irritable bowel syndrome, and  (spontaneous vaginal delivery)    Patient Active Problem List    Diagnosis Date Noted    Encounter for annual routine gynecological examination 09/28/2021    Encounter for screening mammogram for breast cancer 09/28/2021    Rectocele 09/28/2021    BMI 45 0-49 9, adult ([facility] [address] ) 09/28/2021    Chronic RUQ pain 08/20/2020    Essential hypertension 08/20/2020    Fatty liver 08/20/2020    Class 3 severe obesity due to excess calories without serious comorbidity with body mass index (BMI) of 45 0 to 49 9 in adult [facility]) 09/17/2019    Visit for screening mammogram 09/05/2018    Abnormal female pelvic exam 04/16/2018    Endometrial polyp 04/13/2018    Varicose veins of lower extremity 01/18/2017   PMH:  Aries Lorenzo  G8, P8; SAVD x 8  LTL  Cholecystectomy  HTN '19  Morbid Obesity  R Vulvar Bx 4/16- Melanotic macule  Left abdominal wall herniorrhaphy  Menopause 53 '16  Accident above left ankle  Pelvic Pain 3/18 - US which led to concern of endometrium, suggestion of polyp                             - EMB 4/18 nl, HSC/D&C scheduled then cancelled 5/18                             - repeat US w resolution of hydrosalpinx but stable endometrium                             - EMB 7/20- nl, consideration for HSC/D&C ended [no pelvic pain, no                               PMB]     ED- Yeast 7/15/21  Rectocele - 7/21   Varicose vein surgery planned for the near future  She  has a past surgical history that includes Appendectomy; Gallbladder surgery; Hernia repair; and Tubal ligation  Her family history includes Heart attack in her father and sister; Kidney disease in her mother; No Known Problems in her brother, daughter, daughter, daughter, sister, sister, son, son, son, son, and son; Stroke in her sister  FH:  M - d 6/21 Ruptured Gall Bladder  S - d '22 ruptured appendix  68  S - d '22 CVA 77  She  reports that she has never smoked  She has never used smokeless tobacco  She reports that she does not drink alcohol and does not use drugs  SH:   13, father of her 8 children- he left when she was 30 [ considered her too old]  Remarried '18   She used to work 12 hour night shifts at [employer] 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  Also places [employer]  She was born in [facility] and moved to the [place] in [address] [place] [address]  She has 5 grandchildren[ 2 girls 3 boys]  Current Outpatient Medications   Medication Sig Dispense Refill    hydrochlorothiazide (HYDRODIURIL) 12 5 mg tablet Take 1 tablet (12 5 mg total) by mouth daily 90 tablet 1     No current facility-administered medications for this visit  Current Outpatient Medications on File Prior to Visit   Medication Sig    hydrochlorothiazide (HYDRODIURIL) 12 5 mg tablet Take 1 tablet (12 5 mg total) by mouth daily     No current facility-administered medications on file prior to visit  She has No Known Allergies       Review of Systems   Constitutional: Negative for activity change, appetite change, fatigue and unexpected weight change  Eyes: Positive for visual disturbance  Respiratory: Negative for cough, chest tightness, shortness of breath and wheezing  Cardiovascular: Negative for chest pain, palpitations and leg swelling  Breast: Patient denies tenderness, nipple discharge, masses, or erythema  Gastrointestinal: Negative for abdominal distention, abdominal pain, blood in stool, constipation, diarrhea, nausea and vomiting  Constipation at times, not currently  Endocrine: Negative for cold intolerance and heat intolerance  Genitourinary: Negative for decreased urine volume, difficulty urinating, dysuria, frequency, hematuria, menstrual problem, pelvic pain, urgency, vaginal bleeding, vaginal discharge and vaginal pain  No incontinence  Not sexually active due to no libido from depression  Musculoskeletal: Negative for arthralgias  Skin: Negative for rash  Neurological: Negative for weakness, light-headedness, numbness and headaches  Hematological: Does not bruise/bleed easily  Psychiatric/Behavioral: Positive for dysphoric mood  Negative for agitation, behavioral problems and sleep disturbance  The patient is not nervous/anxious  Objective:    Vitals:    09/30/22 0759   BP: 130/82   BP Location: Left arm   Patient Position: Sitting   Cuff Size: Standard   Weight: 133 kg (292 lb 3 2 oz)   Height: 5' 4" (1 626 m)            Physical Exam  Vitals and nursing note reviewed  Constitutional:       General: She is not in acute distress  Appearance: She is well-developed  HENT:      Head: Normocephalic and atraumatic  Eyes:      General: No scleral icterus  Right eye: No discharge  Left eye: No discharge  Extraocular Movements: Extraocular movements intact  Conjunctiva/sclera: Conjunctivae normal    Neck:      Thyroid: No thyromegaly  Trachea: No tracheal deviation  Cardiovascular:      Rate and Rhythm: Normal rate and regular rhythm  Heart sounds: Normal heart sounds  No murmur heard  Pulmonary:      Effort: Pulmonary effort is normal  No respiratory distress  Breath sounds: Normal breath sounds  No wheezing  Chest:   Breasts: Breasts are symmetrical       Right: No inverted nipple, mass, nipple discharge, skin change or tenderness  Left: No inverted nipple, mass, nipple discharge, skin change or tenderness  Abdominal:      General: Bowel sounds are normal  There is no distension  Palpations: Abdomen is soft  There is no mass  Tenderness: There is no abdominal tenderness  There is no guarding or rebound  Genitourinary:     General: Normal vulva  Labia:         Right: No rash, tenderness or lesion  Left: No rash, tenderness or lesion  Vagina: Normal       Cervix: No cervical motion tenderness or discharge  Uterus: Not deviated, not enlarged and not tender  Adnexa:         Right: No mass, tenderness or fullness  Left: No mass, tenderness or fullness          Rectum: No external hemorrhoid  Comments: Urethral meatus within normal limits  Perineum within normal limits  Bladder well supported  Physiologic vaginal atrophy  Stable moderate rectocele with lateral wall relaxation  The pelvis is deep  Musculoskeletal:         General: No tenderness  Normal range of motion  Cervical back: Normal range of motion and neck supple  Comments:  Small varicose veins bilaterally   Lymphadenopathy:      Cervical: No cervical adenopathy  Skin:     General: Skin is warm and dry  Findings: Rash ( stasis changes of the lower extremities especially at a prior accident site above the left ankle) present  Neurological:      Mental Status: She is alert and oriented to person, place, and time  Psychiatric:         Mood and Affect: Mood normal          Behavior: Behavior normal          Thought Content:  Thought content normal          Judgment: Judgment normal

## 2022-09-30 ENCOUNTER — ANNUAL EXAM (OUTPATIENT)
Dept: OBGYN CLINIC | Facility: CLINIC | Age: 57
End: 2022-09-30

## 2022-09-30 VITALS
DIASTOLIC BLOOD PRESSURE: 82 MMHG | SYSTOLIC BLOOD PRESSURE: 130 MMHG | WEIGHT: 292.2 LBS | HEIGHT: 64 IN | BODY MASS INDEX: 49.89 KG/M2

## 2022-09-30 DIAGNOSIS — Z12.31 ENCOUNTER FOR SCREENING MAMMOGRAM FOR BREAST CANCER: ICD-10-CM

## 2022-09-30 DIAGNOSIS — N81.6 RECTOCELE: ICD-10-CM

## 2022-09-30 DIAGNOSIS — Z01.419 ENCOUNTER FOR ANNUAL ROUTINE GYNECOLOGICAL EXAMINATION: Primary | ICD-10-CM

## 2022-09-30 PROCEDURE — 99396 PREV VISIT EST AGE 40-64: CPT | Performed by: OBSTETRICS & GYNECOLOGY

## 2022-10-07 ENCOUNTER — OFFICE VISIT (OUTPATIENT)
Dept: INTERNAL MEDICINE CLINIC | Facility: CLINIC | Age: 57
End: 2022-10-07

## 2022-10-07 VITALS
WEIGHT: 291.6 LBS | HEART RATE: 71 BPM | HEIGHT: 65 IN | SYSTOLIC BLOOD PRESSURE: 129 MMHG | OXYGEN SATURATION: 98 % | TEMPERATURE: 98.4 F | BODY MASS INDEX: 48.58 KG/M2 | DIASTOLIC BLOOD PRESSURE: 80 MMHG

## 2022-10-07 DIAGNOSIS — R10.9 FLANK PAIN: Primary | ICD-10-CM

## 2022-10-07 PROCEDURE — 99213 OFFICE O/P EST LOW 20 MIN: CPT | Performed by: INTERNAL MEDICINE

## 2022-10-07 RX ORDER — PHENAZOPYRIDINE HYDROCHLORIDE 99.5 MG/1
TABLET ORAL
COMMUNITY

## 2022-10-07 NOTE — PROGRESS NOTES
[facility]  INTERNAL MEDICINE OFFICE VISIT     PATIENT INFORMATION     Isha Guerrero   59 y o  female   MRN: [id number]    ASSESSMENT/PLAN     1  Flank pain  Assessment & Plan:  No signs or symptoms of kidney stones  No trauma to area  ROM WNL    Hx of similar complaints in past which U/S kidneys and bladder were negative    Given voltaren and referral to PT  Encouraged conservative measures such as ice, heat, rest, stretching  Consider muscle relaxant if no improvement  Consider U/S however, low likelihood of efficacy    Orders:  -     Ambulatory Referral to Physical Therapy; Future  -     Diclofenac Sodium (VOLTAREN) 1 %; Apply 2 g topically 4 (four) times a day        Schedule a follow-up appointment in as needed  HEALTH MAINTENANCE     Immunization History   Administered Date(s) Administered    COVID-19 PFIZER VACCINE 0 3 ML IM 06/22/2021, 07/13/2021, 02/17/2022    H1N1, All Formulations 10/27/2017    INFLUENZA 10/01/2014, 10/01/2015, 10/03/2016, 10/27/2017    Influenza, recombinant, quadrivalent,injectable, preservative free 10/20/2021    Td (adult), adsorbed 06/10/2010    Tdap 10/30/2017     Immunizations:  · deferred  Screening:  · Deferred       CHIEF COMPLAINT     Chief Complaint   Patient presents with    Back Pain     Right lower back pain radiates to the side- started about 15 days ago       HISTORY OF PRESENT ILLNESS     Patient is a 59 year odl female who presents today for flank pain  Pain has been worse over past month, but she has had it in the past  Extensively worked up in past with multiple negative ultrasounds and CT a/P  Patient denies any n/v/c, hematuria, trauma to the area, foul smelling urine, increased frequency, or dysuria  She drinks 3L of water daily  Of note, 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, having repetitive motions up and down as she is R hand dominant   She tried Pyridine, which helped for  A bit but patient states pain is generally worse at night after work    REVIEW OF SYSTEMS     Review of Systems   Constitutional: Negative for fever  Respiratory: Negative for shortness of breath and wheezing  Cardiovascular: Negative for chest pain and palpitations  Gastrointestinal: Negative for abdominal pain, diarrhea, nausea and vomiting  Genitourinary: Negative for difficulty urinating, dysuria and frequency  Neurological: Negative for dizziness and light-headedness  OBJECTIVE     Vitals:    10/07/22 1036   BP: 129/80   BP Location: Right arm   Patient Position: Sitting   Cuff Size: Large   Pulse: 71   Temp: 98 4 °F (36 9 °C)   TempSrc: Temporal   SpO2: 98%   Weight: 132 kg (291 lb 9 6 oz)   Height: 5' 5" (1 651 m)     Physical Exam  Constitutional:       Appearance: She is obese  She is not ill-appearing  Cardiovascular:      Rate and Rhythm: Normal rate  Heart sounds: No murmur heard  No gallop  Pulmonary:      Effort: Pulmonary effort is normal       Breath sounds: No wheezing or rales  Abdominal:      General: Bowel sounds are normal       Palpations: Abdomen is soft  Tenderness: There is no abdominal tenderness  There is no right CVA tenderness, left CVA tenderness or guarding  Musculoskeletal:         General: No tenderness  Normal range of motion  Right lower leg: No edema  Left lower leg: No edema  Comments: R flank hypertonicity, mild tenderness to palpation   Skin:     General: Skin is warm and dry  Capillary Refill: Capillary refill takes less than 2 seconds  Findings: No bruising, erythema, lesion or rash  Neurological:      Mental Status: She is alert and oriented to person, place, and time  Motor: No weakness     Psychiatric:         Mood and Affect: Mood normal          Behavior: Behavior normal        CURRENT MEDICATIONS     Current Outpatient Medications:     Diclofenac Sodium (VOLTAREN) 1 %, Apply 2 g topically 4 (four) times a day, Disp: 350 g, Rfl: 1    hydrochlorothiazide (HYDRODIURIL) 12 5 mg tablet, Take 1 tablet (12 5 mg total) by mouth daily, Disp: 90 tablet, Rfl: 1    Phenazopyridine HCl (AZO Urinary Pain Relief) 99 5 MG TABS, Take by mouth (Patient not taking: Reported on 10/7/2022), Disp: , Rfl:     PAST MEDICAL & SURGICAL HISTORY     Past Medical History:   Diagnosis Date    Anxiety     Breast lump     last assessed: 2014    Essential hypertension 2020    Hemorrhoid     History of varicose veins     last assessed: 2017    Irritable bowel syndrome     last assessed: 2012     (spontaneous vaginal delivery)     last assessed: 2016     Past Surgical History:   Procedure Laterality Date    APPENDECTOMY      last assessed: 10/21/2014    GALLBLADDER SURGERY      last assessed: 10/21/2014    HERNIA REPAIR      TUBAL LIGATION      last assessed: 2016     SOCIAL & FAMILY HISTORY     Social History     Socioeconomic History    Marital status:      Spouse name: Not on file    Number of children: Not on file    Years of education: Not on file    Highest education level: Not on file   Occupational History    Not on file   Tobacco Use    Smoking status: Never Smoker    Smokeless tobacco: Never Used   Vaping Use    Vaping Use: Never used   Substance and Sexual Activity    Alcohol use: Never    Drug use: Never    Sexual activity: Yes     Partners: Male     Birth control/protection: Female Sterilization     Comment: tubal ligation surgery   Other Topics Concern    Not on file   Social History Narrative    Not on file     Social Determinants of Health     Financial Resource Strain: Low Risk     Difficulty of Paying Living Expenses: Not hard at all   Food Insecurity: No Food Insecurity    Worried About [address] in the Last Year: Never true    [address] in the Last Year: Never true   Transportation Needs: No Transportation Needs    Lack of Transportation (Medical):  No    Lack of Transportation (Non-Medical): No   Physical Activity: Not on file   Stress: Not on file   Social Connections: Not on file   Intimate Partner Violence: Not on file   Housing Stability: Low Risk     Unable to Pay for Housing in the Last Year: No    Number of Places Lived in the Last Year: 1    Unstable Housing in the Last Year: No     Social History     Substance and Sexual Activity   Alcohol Use Never     Substance and Sexual Activity   Alcohol Use Never        Substance and Sexual Activity   Drug Use Never     Social History     Tobacco Use   Smoking Status Never Smoker   Smokeless Tobacco Never Used     Family History   Problem Relation Age of Onset    Kidney disease Mother     Heart attack Father     Heart attack Sister     Stroke Sister     No Known Problems Sister     No Known Problems Sister     No Known Problems Brother     No Known Problems Daughter     No Known Problems Daughter     No Known Problems Daughter     No Known Problems Son     No Known Problems Son     No Known Problems Son     No Known Problems Son     No Known Problems Son      ==  Dayton Sutherland DO    [facility]    [address] [place] [address] , [address] [address] [facility] [facility]  Office: [phone]  Fax: [phone]

## 2022-10-07 NOTE — ASSESSMENT & PLAN NOTE
No signs or symptoms of kidney stones  No trauma to area  ROM WNL    Hx of similar complaints in past which U/S kidneys and bladder were negative    Given voltaren and referral to PT  Encouraged conservative measures such as ice, heat, rest, stretching  Consider muscle relaxant if no improvement  Consider U/S however, low likelihood of efficacy

## 2022-11-15 ENCOUNTER — TELEPHONE (OUTPATIENT)
Dept: INTERNAL MEDICINE CLINIC | Facility: CLINIC | Age: 57
End: 2022-11-15

## 2022-12-15 ENCOUNTER — HOSPITAL ENCOUNTER (OUTPATIENT)
Dept: MAMMOGRAPHY | Facility: MEDICAL CENTER | Age: 57
Discharge: HOME | End: 2022-12-15

## 2022-12-15 DIAGNOSIS — Z12.31 BREAST CANCER SCREENING BY MAMMOGRAM: ICD-10-CM

## 2022-12-20 ENCOUNTER — TELEPHONE (OUTPATIENT)
Dept: OBGYN CLINIC | Facility: CLINIC | Age: 57
End: 2022-12-20

## 2022-12-20 NOTE — TELEPHONE ENCOUNTER
----- Message from Timothy Moran [place] sent at 12/20/2022 12:54 PM EST -----  Please notify her of the following:    Normal screening mammogram    Continue with monthly breast self exam, annual clinical breast exam and annual mammogram

## 2022-12-20 NOTE — TELEPHONE ENCOUNTER
Pt called, does not speak english so I obtained her phone number and called back with an  through [employer]     Attempted to contact pt two times with  and the call immediately went to a message saying there was no voicemail box set up

## 2023-09-09 DIAGNOSIS — I10 ESSENTIAL HYPERTENSION: ICD-10-CM

## 2023-09-11 RX ORDER — HYDROCHLOROTHIAZIDE 12.5 MG/1
TABLET ORAL
Qty: 90 TABLET | Refills: 0 | Status: SHIPPED | OUTPATIENT
Start: 2023-09-11

## 2024-01-19 ENCOUNTER — HOSPITAL ENCOUNTER (OUTPATIENT)
Dept: MAMMOGRAPHY | Facility: HOSPITAL | Age: 59
Discharge: HOME | End: 2024-01-19
Attending: OBSTETRICS & GYNECOLOGY
Payer: COMMERCIAL

## 2024-01-19 VITALS — BODY MASS INDEX: 48.48 KG/M2 | HEIGHT: 65 IN | WEIGHT: 291 LBS

## 2024-01-19 DIAGNOSIS — Z12.13 ENCOUNTER FOR SCREENING FOR MALIGNANT NEOPLASM OF SMALL INTESTINE: ICD-10-CM

## 2024-01-19 PROCEDURE — 77063 BREAST TOMOSYNTHESIS BI: CPT

## 2024-01-19 PROCEDURE — 77067 SCR MAMMO BI INCL CAD: CPT

## 2024-01-22 ENCOUNTER — TELEPHONE (OUTPATIENT)
Dept: OBGYN CLINIC | Facility: CLINIC | Age: 59
End: 2024-01-22

## 2024-01-22 NOTE — TELEPHONE ENCOUNTER
----- Message from Andrés Mack MD sent at 1/22/2024 12:20 PM EST -----  Results are normal  Please notify patient

## 2024-02-14 NOTE — PROGRESS NOTES
Assessment/Plan:  NGE                                                                                                Rectocele '-previously asymptomatic.  Now symptomatic following a fall 20 days ago.  Options discussed included observation, surgery, and a pessary.  Information was printed in [employer] regarding the pessary and a referral was given for urogynecology.  No straining involved with lifting or bowel movements  Obesity - BMI 50  Co- Testing q 5 yrs. -                                                                              RTO 1 yr.                                                                                    SBA monthly  3 D Mammography -  nl  Colonoscopy  - Polyps, repeat   Exercise 3/wk   - has a visit with a nutritionist soon                Calcium 1,000 mg/d with Vit D - to add a supplement     Depression Screen: Neg       Diagnoses and all orders for this visit:    Encounter for annual routine gynecological examination    Encounter for screening mammogram for breast cancer  -     Mammo screening bilateral w 3d & cad; Future    Rectocele  -     Ambulatory Referral to Urogynecology; Future    BMI 45.0-49.9, adult ([facility])              Subjective:        Patient ID: Carmencita Guerrero is a 58 y.o. female.    Carmencita presents today for an annual visit.  About 20 days ago she fell and time noticed a bulge coming from the vagina which has been painful.  This discomfort continues.  She is also noted some lower abdominal discomfort and pain in the right knee.  She is [place] while visiting her daughter.  She has a known rectocele.  She is not sexually active.  She denies any vaginal bleeding.            The following portions of the patient's history were reviewed and updated as appropriate: She  has a past medical history of Anxiety, Breast lump, Essential hypertension (2020), Hemorrhoid, History of varicose veins, Irritable bowel syndrome, and  (spontaneous  vaginal delivery).  Patient Active Problem List    Diagnosis Date Noted    Flank pain 10/07/2022    Encounter for annual routine gynecological examination 09/28/2021    Encounter for screening mammogram for breast cancer 09/28/2021    Rectocele 09/28/2021    BMI 45.0-49.9, adult ([facility]) 09/28/2021    Chronic RUQ pain 08/20/2020    Essential hypertension 08/20/2020    Fatty liver 08/20/2020    Class 3 severe obesity due to excess calories without serious comorbidity with body mass index (BMI) of 45.0 to 49.9 in adult ([facility]) 09/17/2019    Visit for screening mammogram 09/05/2018    Abnormal female pelvic exam 04/16/2018    Endometrial polyp 04/13/2018    Varicose veins of lower extremity 01/18/2017   PMH:  Menarche 11  G8, P8; SAVD x 8  LTL  Cholecystectomy  HTN '19  Morbid Obesity  R Vulvar Bx 4/16- Melanotic macule  Left abdominal wall herniorrhaphy  Menopause 51 '16  Accident above left ankle  Pelvic Pain 3/18 - US which led to concern of endometrium, suggestion of polyp                             - EMB 4/18 nl, HSC/D&C scheduled then cancelled 5/18                             - repeat US w resolution of hydrosalpinx but stable endometrium                             - EMB 7/20- nl, consideration for HSC/D&C ended [no pelvic pain, no                               PMB]     ED- Yeast 7/15/21  Rectocele - 7/21   Varicose vein surgery was planned for '23  She  has a past surgical history that includes Appendectomy; Gallbladder surgery; Hernia repair; and Tubal ligation.  Her family history includes Heart attack in her father and sister; Kidney disease in her mother; No Known Problems in her brother, daughter, daughter, daughter, sister, sister, son, son, son, son, and son; Stroke in her sister.  FH:  M - d 6/21 Ruptured Gall Bladder  S - d '22 ruptured appendix  69  S - d '22 CVA 65  She  reports that she has never smoked. She has never used smokeless tobacco. She reports that she does not drink alcohol and does not use  drugs.  SH:   12, father of her 8 children- he left when she was 25 [ considered her too old]. Remarried '18.  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  Also places [employer].  She was born in [place] and moved to the [place] in 1999. She has 5 grandchildren[ 2 girls 3 boys].  Current Outpatient Medications   Medication Sig Dispense Refill    Diclofenac Sodium (VOLTAREN) 1 % Apply 2 g topically 4 (four) times a day 350 g 1    hydrochlorothiazide (HYDRODIURIL) 12.5 mg tablet TAKE 1 TABLET(12.5 MG) BY MOUTH DAILY 90 tablet 0    Phenazopyridine HCl (AZO Urinary Pain Relief) 99.5 MG TABS Take by mouth (Patient not taking: Reported on 10/7/2022)       No current facility-administered medications for this visit.     Current Outpatient Medications on File Prior to Visit   Medication Sig    Diclofenac Sodium (VOLTAREN) 1 % Apply 2 g topically 4 (four) times a day    hydrochlorothiazide (HYDRODIURIL) 12.5 mg tablet TAKE 1 TABLET(12.5 MG) BY MOUTH DAILY    Phenazopyridine HCl (AZO Urinary Pain Relief) 99.5 MG TABS Take by mouth (Patient not taking: Reported on 10/7/2022)     No current facility-administered medications on file prior to visit.     She has No Known Allergies..    Review of Systems   Constitutional:  Negative for activity change, appetite change, fatigue and unexpected weight change.   Eyes:  Negative for visual disturbance.   Respiratory:  Negative for cough, chest tightness, shortness of breath and wheezing.    Cardiovascular:  Negative for chest pain, palpitations and leg swelling.        Breast: Patient denies tenderness, nipple discharge, masses, or erythema.   Gastrointestinal:  Positive for abdominal pain (At times since the fall). Negative for abdominal distention, blood in stool, constipation, diarrhea, nausea and vomiting.   Genitourinary:  Negative for decreased urine volume, difficulty  "urinating, dyspareunia, dysuria, frequency, hematuria, menstrual problem, pelvic pain, urgency, vaginal bleeding, vaginal discharge and vaginal pain.        Not sexually active.  No incontinence   Musculoskeletal:  Positive for arthralgias (Right knee).   Skin:  Negative for rash.   Neurological:  Negative for weakness, light-headedness, numbness and headaches.   Hematological:  Does not bruise/bleed easily.   Psychiatric/Behavioral:  Negative for agitation, behavioral problems and sleep disturbance. The patient is nervous/anxious (Intermittently).          Objective:    Vitals:    02/15/24 1120   BP: 128/88   BP Location: Right arm   Patient Position: Sitting   Cuff Size: Large   Weight: 134 kg (295 lb)   Height: 5' 5\" (1.651 m)            Physical Exam  Vitals and nursing note reviewed.   Constitutional:       General: She is not in acute distress.     Appearance: She is well-developed.   HENT:      Head: Normocephalic and atraumatic.   Eyes:      General: No scleral icterus.        Right eye: No discharge.         Left eye: No discharge.      Extraocular Movements: Extraocular movements intact.      Conjunctiva/sclera: Conjunctivae normal.   Neck:      Thyroid: No thyromegaly.      Trachea: No tracheal deviation.   Cardiovascular:      Rate and Rhythm: Normal rate and regular rhythm.      Heart sounds: Normal heart sounds. No murmur heard.  Pulmonary:      Effort: Pulmonary effort is normal. No respiratory distress.      Breath sounds: Normal breath sounds. No wheezing.   Chest:   Breasts:     Breasts are symmetrical.      Right: No inverted nipple, mass, nipple discharge, skin change or tenderness.      Left: No inverted nipple, mass, nipple discharge, skin change or tenderness.   Abdominal:      General: Bowel sounds are normal. There is no distension.      Palpations: Abdomen is soft. There is no mass.      Tenderness: There is no abdominal tenderness. There is no guarding or rebound.   Genitourinary:     " General: Normal vulva.      Labia:         Right: No rash, tenderness or lesion.         Left: No rash, tenderness or lesion.       Vagina: Normal.      Cervix: No cervical motion tenderness or discharge.      Uterus: Not deviated, not enlarged and not tender.       Adnexa:         Right: No mass, tenderness or fullness.          Left: No mass, tenderness or fullness.        Rectum: No external hemorrhoid.      Comments: Urethral meatus within normal limits.  Perineum within normal limits.  Bladder well supported. Physiologic vaginal atrophy. Moderate rectocele with lateral wall relaxation. The pelvis is deep.  The rectocele does not look much different than last year.  Musculoskeletal:         General: No tenderness. Normal range of motion.      Cervical back: Normal range of motion and neck supple.      Comments:  Small varicose veins bilaterally   Lymphadenopathy:      Cervical: No cervical adenopathy.   Skin:     General: Skin is warm and dry.      Findings: Rash ( stasis changes of the lower extremities especially at a prior accident site above the left ankle) present.   Neurological:      Mental Status: She is alert and oriented to person, place, and time.   Psychiatric:         Mood and Affect: Mood normal.         Behavior: Behavior normal.         Thought Content: Thought content normal.         Judgment: Judgment normal.

## 2024-02-15 ENCOUNTER — ANNUAL EXAM (OUTPATIENT)
Dept: OBGYN CLINIC | Facility: CLINIC | Age: 59
End: 2024-02-15

## 2024-02-15 VITALS
WEIGHT: 293 LBS | HEIGHT: 65 IN | SYSTOLIC BLOOD PRESSURE: 128 MMHG | BODY MASS INDEX: 48.82 KG/M2 | DIASTOLIC BLOOD PRESSURE: 88 MMHG

## 2024-02-15 DIAGNOSIS — Z12.31 ENCOUNTER FOR SCREENING MAMMOGRAM FOR BREAST CANCER: ICD-10-CM

## 2024-02-15 DIAGNOSIS — N81.6 RECTOCELE: ICD-10-CM

## 2024-02-15 DIAGNOSIS — Z01.419 ENCOUNTER FOR ANNUAL ROUTINE GYNECOLOGICAL EXAMINATION: Primary | ICD-10-CM

## 2024-02-15 PROCEDURE — 99396 PREV VISIT EST AGE 40-64: CPT | Performed by: OBSTETRICS & GYNECOLOGY

## 2024-02-21 PROBLEM — Z01.419 ENCOUNTER FOR ANNUAL ROUTINE GYNECOLOGICAL EXAMINATION: Status: RESOLVED | Noted: 2021-09-28 | Resolved: 2024-02-21

## 2024-08-14 ENCOUNTER — TELEPHONE (OUTPATIENT)
Dept: FAMILY MEDICINE CLINIC | Facility: CLINIC | Age: 59
End: 2024-08-14

## 2024-08-14 NOTE — TELEPHONE ENCOUNTER
Patient called [facility] to inquire about missed calls from this number, however no one called from this office.    Patient also wanted to let [facility] office know she must cancel the 8/15 appointment, and asked us to let the office know, as she is currently out of the area and will call again to schedule.

## 2024-10-23 ENCOUNTER — NURSE TRIAGE (OUTPATIENT)
Age: 59
End: 2024-10-23

## 2024-10-23 NOTE — TELEPHONE ENCOUNTER
"Patient called in, utilized [employer]  #[id number] Julio. Patient is reporting starting with Post menopausal bleeding 4 days ago. States it is light spotting, only noticing when she wipes. Denies any cramping or pain. States she did some strenuous activity and not sure if this had an effect on it. States at rest she does not have any bleeding and only notices it when she strains and wipes. Reviewed with patient keeping a bleeding/spotting diary. Reviewed with patient monitoring symptoms in the meantime, and contacting the office back with any new or worsening symptoms. Attempted to schedule patient for eval, no availability. Called to the office and spoke with Fiona who advised to send a message over to the clerical pool and they will reach out with an appt. Informed patient, no further questions.     Reason for Disposition   Postmenopausal vaginal bleeding    Answer Assessment - Initial Assessment Questions  1. BLEEDING SEVERITY: \"Describe the bleeding that you are having.\" \"How much bleeding is there?\"       Slight bleeding only when she goes to the bathroom and urinates, not having any at this time- confirms is vaginal  2. ONSET: \"When did the bleeding begin?\" \"Is it continuing now?\"      Started 4 days ago   3. MENOPAUSE: \"When was your last menstrual period?\"       5 years   4. ABDOMEN PAIN: \"Do you have any pain?\" \"How bad is the pain?\"  (e.g., Scale 0-10; none, mild, moderate, or severe)      Denies   5. BLOOD THINNERS: \"Do you take any blood thinners?\" (e.g., Coumadin/warfarin, Pradaxa/dabigatran, aspirin)      Denies  6. HORMONE MEDICINES: \"Are you taking any hormone medicines, prescription or OTC?\" (e.g., birth control pills, estrogen)      Denies  7. CAUSE: \"What do you think is causing the bleeding?\" (e.g., recent gyn surgery, recent gyn procedure; known bleeding disorder, uterine cancer)        Unsure, did heavy activity   8. HEMODYNAMIC STATUS: \"Are you weak or feeling lightheaded?\" If Yes, ask: " "\"Can you stand and walk normally?\"        Denies  9. OTHER SYMPTOMS: \"What other symptoms are you having with the bleeding?\" (e.g., back pain, burning with urination, fever)      Denies    Protocols used: Vaginal Bleeding - Postmenopausal-Adult-[place]    "

## 2024-10-29 ENCOUNTER — OFFICE VISIT (OUTPATIENT)
Dept: INTERNAL MEDICINE CLINIC | Facility: CLINIC | Age: 59
End: 2024-10-29

## 2024-10-29 VITALS
WEIGHT: 293 LBS | HEIGHT: 65 IN | DIASTOLIC BLOOD PRESSURE: 92 MMHG | HEART RATE: 76 BPM | SYSTOLIC BLOOD PRESSURE: 153 MMHG | TEMPERATURE: 97.6 F | BODY MASS INDEX: 48.82 KG/M2

## 2024-10-29 DIAGNOSIS — N81.10 VAGINAL PROLAPSE: ICD-10-CM

## 2024-10-29 DIAGNOSIS — E66.01 CLASS 3 SEVERE OBESITY DUE TO EXCESS CALORIES WITHOUT SERIOUS COMORBIDITY WITH BODY MASS INDEX (BMI) OF 45.0 TO 49.9 IN ADULT (HCC): Chronic | ICD-10-CM

## 2024-10-29 DIAGNOSIS — Z00.00 ANNUAL PHYSICAL EXAM: Primary | ICD-10-CM

## 2024-10-29 DIAGNOSIS — R10.9 FLANK PAIN: ICD-10-CM

## 2024-10-29 DIAGNOSIS — I10 ESSENTIAL HYPERTENSION: ICD-10-CM

## 2024-10-29 DIAGNOSIS — K64.0 GRADE I HEMORRHOIDS: ICD-10-CM

## 2024-10-29 DIAGNOSIS — Z12.4 CERVICAL CANCER SCREENING: ICD-10-CM

## 2024-10-29 DIAGNOSIS — E66.813 CLASS 3 SEVERE OBESITY DUE TO EXCESS CALORIES WITHOUT SERIOUS COMORBIDITY WITH BODY MASS INDEX (BMI) OF 45.0 TO 49.9 IN ADULT (HCC): Chronic | ICD-10-CM

## 2024-10-29 PROBLEM — K64.9 HEMORRHOIDS: Status: ACTIVE | Noted: 2024-10-29

## 2024-10-29 PROBLEM — R73.09 ELEVATED GLUCOSE: Status: ACTIVE | Noted: 2023-03-09

## 2024-10-29 PROBLEM — D12.3 ADENOMATOUS POLYP OF TRANSVERSE COLON: Status: ACTIVE | Noted: 2023-03-09

## 2024-10-29 PROCEDURE — 99396 PREV VISIT EST AGE 40-64: CPT | Performed by: STUDENT IN AN ORGANIZED HEALTH CARE EDUCATION/TRAINING PROGRAM

## 2024-10-29 RX ORDER — PHENAZOPYRIDINE HYDROCHLORIDE 99.5 MG/1
1 TABLET ORAL DAILY
Qty: 15 TABLET | Refills: 5 | Status: SHIPPED | OUTPATIENT
Start: 2024-10-29

## 2024-10-29 NOTE — ASSESSMENT & PLAN NOTE
Mild vaginal bulge on exam, no tenderness to palpation, foul odors, discharge, or lesions noted    Vaginal vs uterine prolapse  PMHX 8 vaginal births, which increases risk  Re-referral to OBGYN and uro GYN for surgery vs pessary vs sling vs other options   Pt had poor experience with students and provider, thus specifically referred to Dr. Almonte  Encouraged pelvic floor muscles  Consider pelvic PT    Orders:    Ambulatory Referral to Urogynecology; Future

## 2024-10-29 NOTE — ASSESSMENT & PLAN NOTE
- pt with BMI of Body mass index is 49.92 kg/m².  - pt counseled on risks associated with obesity and it's contribution to other health issues  - advise portion control, healthy food choices, drinking water instead of juice/soda  - advise beginning light exercise program (i.e. Walking 30min 4-5x/wk) or consider gym membership  - advise keeping food diary to help identify problem foods/times/stressors  -consider nutrition referral  - pt advised that weight loss of ~ 1lb/wk is a good goal and not to become frustrated if loss is slower  -consider adipex vs topimax, though adipex less cost prohibitive  -pt A1c WNL, thus does not qualify for GLP1+/DPP4-i  -as BMI < 35 / >35, does qualify for bariatric surgery at this time  though would benefit from referral if refractory weight loss to conservative measures

## 2024-10-29 NOTE — PATIENT INSTRUCTIONS
"Patient Education     Routine physical for adults   The Basics   Written by the doctors and editors at [facility]   What is a physical? -- A physical is a routine visit, or \"check-up,\" with your doctor. You might also hear it called a \"wellness visit\" or \"preventive visit.\"  During each visit, the doctor will:   Ask about your physical and mental health   Ask about your habits, behaviors, and lifestyle   Do an exam   Give you vaccines if needed   Talk to you about any medicines you take   Give advice about your health   Answer your questions  Getting regular check-ups is an important part of taking care of your health. It can help your doctor find and treat any problems you have. But it's also important for preventing health problems.  A routine physical is different from a \"sick visit.\" A sick visit is when you see a doctor because of a health concern or problem. Since physicals are scheduled ahead of time, you can think about what you want to ask the doctor.  How often should I get a physical? -- It depends on your age and health. In general, for people age 21 years and older:   If you are younger than 50 years, you might be able to get a physical every 3 years.   If you are 50 years or older, your doctor might recommend a physical every year.  If you have an ongoing health condition, like diabetes or high blood pressure, your doctor will probably want to see you more often.  What happens during a physical? -- In general, each visit will include:   Physical exam - The doctor or nurse will check your height, weight, heart rate, and blood pressure. They will also look at your eyes and ears. They will ask about how you are feeling and whether you have any symptoms that bother you.   Medicines - It's a good idea to bring a list of all the medicines you take to each doctor visit. Your doctor will talk to you about your medicines and answer any questions. Tell them if you are having any side effects that bother you. You " "should also tell them if you are having trouble paying for any of your medicines.   Habits and behaviors - This includes:   Your diet   Your exercise habits   Whether you smoke, drink alcohol, or use drugs   Whether you are sexually active   Whether you feel safe at home  Your doctor will talk to you about things you can do to improve your health and lower your risk of health problems. They will also offer help and support. For example, if you want to quit smoking, they can give you advice and might prescribe medicines. If you want to improve your diet or get more physical activity, they can help you with this, too.   Lab tests, if needed - The tests you get will depend on your age and situation. For example, your doctor might want to check your:   Cholesterol   Blood sugar   Iron level   Vaccines - The recommended vaccines will depend on your age, health, and what vaccines you already had. Vaccines are very important because they can prevent certain serious or deadly infections.   Discussion of screening - \"Screening\" means checking for diseases or other health problems before they cause symptoms. Your doctor can recommend screening based on your age, risk, and preferences. This might include tests to check for:   Cancer, such as breast, prostate, cervical, ovarian, colorectal, prostate, lung, or skin cancer   Sexually transmitted infections, such as chlamydia and gonorrhea   Mental health conditions like depression and anxiety  Your doctor will talk to you about the different types of screening tests. They can help you decide which screenings to have. They can also explain what the results might mean.   Answering questions - The physical is a good time to ask the doctor or nurse questions about your health. If needed, they can refer you to other doctors or specialists, too.  Adults older than 65 years often need other care, too. As you get older, your doctor will talk to you about:   How to prevent falling at " home   Hearing or vision tests   Memory testing   How to take your medicines safely   Making sure that you have the help and support you need at home  All topics are updated as new evidence becomes available and our peer review process is complete.  This topic retrieved from [employer] on: May 02, 2024.  Topic 633375 Version 1.0  Release: 32.4.3 - C32.122  © 2024 UpToDate, Inc. and/or its affiliates. All rights reserved.  Consumer Information Use and Disclaimer   Disclaimer: This generalized information is a limited summary of diagnosis, treatment, and/or medication information. It is not meant to be comprehensive and should be used as a tool to help the user understand and/or assess potential diagnostic and treatment options. It does NOT include all information about conditions, treatments, medications, side effects, or risks that may apply to a specific patient. It is not intended to be medical advice or a substitute for the medical advice, diagnosis, or treatment of a health care provider based on the health care provider's examination and assessment of a patient's specific and unique circumstances. Patients must speak with a health care provider for complete information about their health, medical questions, and treatment options, including any risks or benefits regarding use of medications. This information does not endorse any treatments or medications as safe, effective, or approved for treating a specific patient. UpToDate, Inc. and its affiliates disclaim any warranty or liability relating to this information or the use thereof.The use of this information is governed by the Terms of Use, available at https://www.woltersDatapipeuwer.com/en/know/clinical-effectiveness-terms. 2024© UpToDate, Inc. and its affiliates and/or licensors. All rights reserved.  Copyright   © 2024 UpToDate, Inc. and/or its affiliates. All rights reserved.

## 2024-10-29 NOTE — ASSESSMENT & PLAN NOTE
Likely 2/2 Constipation.    + hemorrhoids on exam with +tenderness to palpation    Education about constipation causes and treatment discussed.  Follow up in  6 weeks if symptoms do not improve.  Encouraged plenty of fluid, exercise as tolerated, increase fiber intake   Consider KUB if persistent  Consider GI referral if chronic / refractory  Sitz baths and warm compress to area for hemorrhoids.

## 2024-10-29 NOTE — PROGRESS NOTES
Adult Annual Physical  Name: Carmencita Guerrero      : 1965      MRN: [id number]  Encounter Provider: Leslie Lisa DO  Encounter Date: 10/29/2024   Encounter department: [facility]    Assessment & Plan  Annual physical exam           Calcium 1,000 mg/d with Vit D - encouraged purchasing OTC supplement     - DM screening: UTD, 5.6   - CV screening: UTD  - Colon CA screening: UTD, due 2024 which pt is aware; hx of polyps  - Hep C screening: not indicated  - Breast CA screening: UTD  - Cervical CA screening: UTD  - Osteoporosis screening: not indicated at this time    Counseled on:  - Obesity: BMI 49.92, counseled on importance of weight loss, see below  - ACP documentation, pt has not completed POLST, no living will, no POA   Pt would not like further documentation   Educated [place], next of kin POA, which is       Immunization: pt declined in office, states she will bring in immunization record as she typically goes to local pharmacy         Cervical cancer screening    Orders:    Ambulatory Referral to Obstetrics / Gynecology; Future    Essential hypertension  essential hypertension  continue current regimen: HCTZ, though non-compliant/not currently taking  continue smoking cessation/abstinence  reccommended low salt diet (<2g per day)  counseled on continuing healthy lifestyle modifications    Encouraged pt to bring in BP logs and buy arm cuff; given BP log sheet  RTC 6 weeks for recheck   Consider r/o secondary causing such as MARIA LUISA considering body habitus  Encouraged weight loss  Recheck 152/93 in office         Class 3 severe obesity due to excess calories without serious comorbidity with body mass index (BMI) of 45.0 to 49.9 in adult (HCC)  - pt with BMI of Body mass index is 49.92 kg/m².  - pt counseled on risks associated with obesity and it's contribution to other health issues  - advise portion control, healthy food choices, drinking water  instead of juice/soda  - advise beginning light exercise program (i.e. Walking 30min 4-5x/wk) or consider gym membership  - advise keeping food diary to help identify problem foods/times/stressors  -consider nutrition referral  - pt advised that weight loss of ~ 1lb/wk is a good goal and not to become frustrated if loss is slower  -consider adipex vs topimax, though adipex less cost prohibitive  -pt A1c WNL, thus does not qualify for GLP1+/DPP4-i  -as BMI < 35 / >35, does qualify for bariatric surgery at this time  though would benefit from referral if refractory weight loss to conservative measures       Vaginal prolapse  Mild vaginal bulge on exam, no tenderness to palpation, foul odors, discharge, or lesions noted    Vaginal vs uterine prolapse  PMHX 8 vaginal births, which increases risk  Re-referral to OBGYN and uro GYN for surgery vs pessary vs sling vs other options   Pt had poor experience with students and provider, thus specifically referred to Dr. Almonte  Encouraged pelvic floor muscles  Consider pelvic PT    Orders:    Ambulatory Referral to Urogynecology; Future    Flank pain    Orders:    Diclofenac Sodium (VOLTAREN) 1 %; Apply 2 g topically 4 (four) times a day    BMI 45.0-49.9, adult (HCC)  - pt with BMI of Body mass index is 49.92 kg/m².  - pt counseled on risks associated with obesity and it's contribution to other health issues  - advise portion control, healthy food choices, drinking water instead of juice/soda  - advise beginning light exercise program (i.e. Walking 30min 4-5x/wk) or consider gym membership  - advise keeping food diary to help identify problem foods/times/stressors  -consider nutrition referral  - pt advised that weight loss of ~ 1lb/wk is a good goal and not to become frustrated if loss is slower  -consider adipex vs topimax, though adipex less cost prohibitive  -pt A1c WNL, thus does not qualify for GLP1+/DPP4-i  -as BMI < 35 / >35, does qualify for bariatric surgery at  this time  though would benefit from referral if refractory weight loss to conservative measures          Grade I hemorrhoids  Likely 2/2 Constipation.    + hemorrhoids on exam with +tenderness to palpation    Education about constipation causes and treatment discussed.  Follow up in  6 weeks if symptoms do not improve.  Encouraged plenty of fluid, exercise as tolerated, increase fiber intake   Consider KUB if persistent  Consider GI referral if chronic / refractory  Sitz baths and warm compress to area for hemorrhoids.             Immunizations and preventive care screenings were discussed with patient today. Appropriate education was printed on patient's after visit summary.    Counseling:  Alcohol/drug use: discussed moderation in alcohol intake, the recommendations for healthy alcohol use, and avoidance of illicit drug use.  Dental Health: discussed importance of regular tooth brushing, flossing, and dental visits.  Injury prevention: discussed safety/seat belts, safety helmets, smoke detectors, carbon monoxide detectors, and smoking near bedding or upholstery.  Sexual health: discussed sexually transmitted diseases, partner selection, use of condoms, avoidance of unintended pregnancy, and contraceptive alternatives.  Exercise: the importance of regular exercise/physical activity was discussed. Recommend exercise 3-5 times per week for at least 30 minutes.     BMI Counseling: Body mass index is 49.92 kg/m². The BMI is above normal. Nutrition recommendations include decreasing portion sizes, encouraging healthy choices of fruits and vegetables, consuming healthier snacks, limiting drinks that contain sugar, moderation in carbohydrate intake and increasing intake of lean protein. Exercise recommendations include exercising 3-5 times per week. No pharmacotherapy was ordered. Patient referred to PCP. Rationale for BMI follow-up plan is due to patient being overweight or obese.     Depression Screening and Follow-up  "Plan: Patient was screened for depression during today's encounter. They screened negative with a PHQ-2 score of 0.      History of Present Illness     Adult Annual Physical:  Patient presents for annual physical. PMHX obesity, rectocele,and HTN    Feeling of fullness and heaviness in undercarriage area after mechanical fall, third floor, water or oil on steps??, onto cement, unknown if witnessed or not, but did not lose consciousness; felt like \"something moved inside\"    No bleeding except one or two drops when urinating, could be from vagina or buttocks she notes, and when she strains    8 vaginal births, all uncomplicated            .     Diet and Physical Activity:  - Diet/Nutrition: well balanced diet, portion control, limited junk food, adequate fiber intake and heart healthy (low sodium) diet.  - Exercise: walking and no formal exercise.    Depression Screening:  - PHQ-2 Score: 0    General Health:  - Sleep: sleeps well, 7-8 hours of sleep on average and snores loudly.  - Hearing: normal hearing bilateral ears.  - Vision: wears glasses, no vision problems, most recent eye exam > 1 year ago and most recent eye exam < 1 year ago.  - Dental: regular dental visits and brushes teeth once daily.    /GYN Health:  - Follows with GYN: yes.   - Last menstrual cycle: 2/1/2018.   - History of STDs: no  - Contraception: menopause. limited relations      Advanced Care Planning:  - Has an advanced directive?: no    - Has a durable medical POA?: no    - ACP document given to patient?: no      Review of Systems   Constitutional:  Negative for fever.   HENT:  Negative for congestion.    Eyes:  Negative for visual disturbance.   Respiratory:  Negative for cough and shortness of breath.    Cardiovascular:  Negative for chest pain and palpitations.   Gastrointestinal:  Negative for abdominal pain, constipation, diarrhea, nausea and vomiting.   Endocrine: Negative for cold intolerance and heat intolerance.   Genitourinary:  " "Positive for hematuria.   Neurological:  Negative for dizziness, syncope, light-headedness and headaches.   Psychiatric/Behavioral:  Negative for dysphoric mood and sleep disturbance. The patient is not nervous/anxious.      Pertinent Medical History       Current Outpatient Medications on File Prior to Visit   Medication Sig Dispense Refill    [DISCONTINUED] Diclofenac Sodium (VOLTAREN) 1 % Apply 2 g topically 4 (four) times a day 350 g 1    [DISCONTINUED] hydrochlorothiazide (HYDRODIURIL) 12.5 mg tablet TAKE 1 TABLET(12.5 MG) BY MOUTH DAILY 90 tablet 0    [DISCONTINUED] Phenazopyridine HCl (AZO Urinary Pain Relief) 99.5 MG TABS Take by mouth (Patient not taking: Reported on 10/7/2022)       No current facility-administered medications on file prior to visit.      Social History     Tobacco Use    Smoking status: Never    Smokeless tobacco: Never   Vaping Use    Vaping status: Never Used   Substance and Sexual Activity    Alcohol use: Never    Drug use: Never    Sexual activity: Yes     Partners: Male     Birth control/protection: Female Sterilization     Comment: tubal ligation surgery       Objective     /92 (BP Location: Right arm, Patient Position: Sitting, Cuff Size: Large)   Pulse 76   Temp 97.6 °F (36.4 °C) (Temporal)   Ht 5' 5\" (1.651 m)   Wt 136 kg (300 lb)   LMP  (LMP Unknown)   BMI 49.92 kg/m²     Physical Exam  Constitutional:       General: She is not in acute distress.     Appearance: She is well-developed.   HENT:      Head: Normocephalic and atraumatic.      Right Ear: Tympanic membrane, ear canal and external ear normal. There is no impacted cerumen.      Left Ear: Tympanic membrane, ear canal and external ear normal.      Nose: Nose normal.      Mouth/Throat:      Mouth: Mucous membranes are moist.   Eyes:      General: No scleral icterus.     Conjunctiva/sclera: Conjunctivae normal.      Pupils: Pupils are equal, round, and reactive to light.   Neck:      Thyroid: No thyromegaly. "   Cardiovascular:      Rate and Rhythm: Normal rate and regular rhythm.      Heart sounds: Normal heart sounds. No murmur heard.     No friction rub. No gallop.   Pulmonary:      Effort: Pulmonary effort is normal. No respiratory distress.      Breath sounds: Normal breath sounds. No stridor. No wheezing or rales.   Abdominal:      General: Bowel sounds are normal. There is no distension.      Palpations: Abdomen is soft. There is no mass.      Tenderness: There is no abdominal tenderness. There is no guarding or rebound.   Musculoskeletal:         General: No deformity.      Cervical back: Neck supple.      Right lower leg: No edema.      Left lower leg: No edema.   Skin:     General: Skin is warm.      Capillary Refill: Capillary refill takes less than 2 seconds.      Findings: No erythema or rash.   Neurological:      Mental Status: She is alert.   Psychiatric:         Mood and Affect: Mood normal.         Behavior: Behavior normal.         Thought Content: Thought content normal.         Judgment: Judgment normal.     Administrative Statements   I have spent a total time of 45 minutes in caring for this patient on the day of the visit/encounter including Prognosis, Risks and benefits of tx options, Instructions for management, Patient and family education, Importance of tx compliance, Risk factor reductions, Impressions, Counseling / Coordination of care, Documenting in the medical record, Reviewing / ordering tests, medicine, procedures  , Obtaining or reviewing history  , and Communicating with other healthcare professionals .   Medical Records sent

## 2024-10-29 NOTE — ASSESSMENT & PLAN NOTE
Calcium 1,000 mg/d with Vit D - encouraged purchasing OTC supplement     - DM screening: UTD, 5.6 2024  - CV screening: UTD  - Colon CA screening: UTD, due 5/2024 which pt is aware; hx of polyps  - Hep C screening: not indicated  - Breast CA screening: UTD  - Cervical CA screening: UTD  - Osteoporosis screening: not indicated at this time    Counseled on:  - Obesity: BMI 49.92, counseled on importance of weight loss, see below  - ACP documentation, pt has not completed POLST, no living will, no POA   Pt would not like further documentation   Educated [place], next of kin POA, which is       Immunization: pt declined in office, states she will bring in immunization record as she typically goes to local pharmacy

## 2024-10-29 NOTE — ASSESSMENT & PLAN NOTE
- pt with BMI of Body mass index is 49.92 kg/m².  - pt counseled on risks associated with obesity and it's contribution to other health issues  - advise portion control, healthy food choices, drinking water instead of juice/soda  - advise beginning light exercise program (i.e. Walking 30min 4-5x/wk) or consider gym membership  - advise keeping food diary to help identify problem foods/times/stressors  -consider nutrition referral  - pt advised that weight loss of ~ 1lb/wk is a good goal and not to become frustrated if loss is slower  -consider adipex vs topimax, though adipex less cost prohibitive  -pt A1c WNL, thus does not qualify for GLP1+/DPP4-i  -as BMI < 35 / >35, does qualify for bariatric surgery at this time  though would benefit from referral if refractory weight loss to conservative measures           Never

## 2024-10-29 NOTE — ASSESSMENT & PLAN NOTE
essential hypertension  continue current regimen: HCTZ, though non-compliant/not currently taking  continue smoking cessation/abstinence  reccommended low salt diet (<2g per day)  counseled on continuing healthy lifestyle modifications    Encouraged pt to bring in BP logs and buy arm cuff; given BP log sheet  RTC 6 weeks for recheck   Consider r/o secondary causing such as MARIA LUISA considering body habitus  Encouraged weight loss  Recheck 152/93 in office

## 2024-10-31 ENCOUNTER — TELEPHONE (OUTPATIENT)
Age: 59
End: 2024-10-31

## 2024-10-31 ENCOUNTER — NURSE TRIAGE (OUTPATIENT)
Age: 59
End: 2024-10-31

## 2024-10-31 NOTE — TELEPHONE ENCOUNTER
"Call connected with Azeri speaking interpereter # [phone]  Carmencita is calling to follow up to prior appt request.    She states she fell 2 weeks ago. Slipped on water on stairs. Fell from second floor to first floor- (first day on job) At time of fall, felt something rip inside. Has had blood in urine intermittently since this time. Denies painful urination/frequency or pelvic pain.     She reports she has a prolapse- her mother is midwife and Carmencita knows what a prolapse is.     She is [facility] to [facility]   No appt availability per book it. Attempted to warm transfer to clerical. Per Jimena- no appt availability until January.    Suggested patient contact URO/GYN due to feeling something rip inside followed by hematuria, patient in agreement. Provided contact # for Dr. Dickson/Jaron.        Reason for Disposition  • All other patients with blood in urine  (Exception: Could be normal menstrual bleeding.)     Provided # for urogynecology for suspected injury resulting in hematuria    Answer Assessment - Initial Assessment Questions  1. COLOR of URINE: \"Describe the color of the urine.\"  (e.g., tea-colored, pink, red, bloody) \"Do you have blood clots in your urine?\" (e.g., none, pea, grape, small coin)      Bleeding with urination  2. ONSET: \"When did the bleeding start?\"       15 days  3. EPISODES: \"How many times has there been blood in the urine?\" or \"How many times today?\"      Initially only occassionally Now more frequently especially when trouble holding urine  4. PAIN with URINATION: \"Is there any pain with passing your urine?\" If Yes, ask: \"How bad is the pain?\"  (Scale 1-10; or mild, moderate, severe)      denies  5. FEVER: \"Do you have a fever?\" If Yes, ask: \"What is your temperature, how was it measured, and when did it start?\"      denies  6. ASSOCIATED SYMPTOMS: \"Are you passing urine more frequently than usual?\"      No, has increased amount of water intake.    7. OTHER SYMPTOMS: \"Do you " "have any other symptoms?\" (e.g., back/flank pain, abdomen pain, vomiting)      Fell down stairs at work 2 weeks ago. Fell from second floor to almost first floor. [place] like something became detached at time of fall-sometimes pain in hip.  8. PREGNANCY: \"Is there any chance you are pregnant?\" \"When was your last menstrual period?\"      Post menopausal    Protocols used: Urine - Blood In-Adult-OH    "

## 2024-10-31 NOTE — TELEPHONE ENCOUNTER
Spoke with pt via .     Pt was referred to uro/gyn by obgyn provider and calling to schedule appt with Dr Dickson.     Advised that Dr Dickson is not part of  urology and provided phone number for his office. Pt was appreciative and will call his office to schedule appt. No further action is needed at this time.

## 2024-11-01 NOTE — TELEPHONE ENCOUNTER
[facility] but patient decline due to not need a gyn appt for now.     She stated that she needed to see a different specialty

## 2024-11-28 PROBLEM — Z12.4 CERVICAL CANCER SCREENING: Status: RESOLVED | Noted: 2024-10-29 | Resolved: 2024-11-28

## 2025-01-02 ENCOUNTER — TELEPHONE (OUTPATIENT)
Age: 60
End: 2025-01-02

## 2025-01-02 NOTE — TELEPHONE ENCOUNTER
New Patient    What is the reason for the patient’s appointment? NP- hematuria with small clots since she had a fall in November. I had Taya-RN on the line with me in case that we needed triage but appointment was given to patient for tomorrow. She did not want to keep appointment with [facility] because we have all her previous records.    What office location does the patient prefer? [place]    Does patient have Imaging/Lab Results:  In Epic     Have patient records been requested?  If No, are the records showing in Epic:   In Epic    INSURANCE:   Do we accept the patient's insurance or is the patient Self-Pay?  Self-Pay, will pay for visit in full tomorrow $159      HISTORY:   Has the patient had any previous Urologist(s)? No    Was the patient seen in the ED? No    Has the patient had any outside testing done? No     Does the patient have a personal history of cancer? No

## 2025-01-03 ENCOUNTER — OFFICE VISIT (OUTPATIENT)
Dept: UROLOGY | Facility: CLINIC | Age: 60
End: 2025-01-03

## 2025-01-03 VITALS
WEIGHT: 293 LBS | HEART RATE: 77 BPM | DIASTOLIC BLOOD PRESSURE: 80 MMHG | OXYGEN SATURATION: 96 % | SYSTOLIC BLOOD PRESSURE: 130 MMHG | HEIGHT: 65 IN | BODY MASS INDEX: 48.82 KG/M2

## 2025-01-03 DIAGNOSIS — R31.9 HEMATURIA, UNSPECIFIED TYPE: Primary | ICD-10-CM

## 2025-01-03 DIAGNOSIS — N81.5 VAGINAL ENTEROCELE: ICD-10-CM

## 2025-01-03 DIAGNOSIS — N81.0 FEMALE URETHROCELE: ICD-10-CM

## 2025-01-03 DIAGNOSIS — N81.6 RECTOCELE: ICD-10-CM

## 2025-01-03 DIAGNOSIS — R31.0 GROSS HEMATURIA: ICD-10-CM

## 2025-01-03 LAB
SL AMB  POCT GLUCOSE, UA: NORMAL
SL AMB LEUKOCYTE ESTERASE,UA: NORMAL
SL AMB POCT BILIRUBIN,UA: NORMAL
SL AMB POCT BLOOD,UA: NORMAL
SL AMB POCT CLARITY,UA: CLEAR
SL AMB POCT COLOR,UA: YELLOW
SL AMB POCT KETONES,UA: NORMAL
SL AMB POCT NITRITE,UA: NORMAL
SL AMB POCT PH,UA: 7.5
SL AMB POCT SPECIFIC GRAVITY,UA: 1.01
SL AMB POCT URINE PROTEIN: NORMAL
SL AMB POCT UROBILINOGEN: 0.2

## 2025-01-03 PROCEDURE — 52000 CYSTOURETHROSCOPY: CPT | Performed by: UROLOGY

## 2025-01-03 PROCEDURE — 99204 OFFICE O/P NEW MOD 45 MIN: CPT | Performed by: UROLOGY

## 2025-01-03 PROCEDURE — 81002 URINALYSIS NONAUTO W/O SCOPE: CPT | Performed by: UROLOGY

## 2025-01-03 NOTE — PROGRESS NOTES
"Name: Carmencita Guerrero      : 1965      MRN: [id number]  Encounter Provider: Gus Chapa MD  Encounter Date: 1/3/2025   Encounter department: [facility] [place] [facility]  :  Assessment & Plan  Hematuria, unspecified type    Orders:  •  POCT urine dip    Rectocele    Orders:  •  Ambulatory Referral to Urogynecology; Future    Gross hematuria      Impression: Resolved gross hematuria, rectocele    Plan: I provided the patient with reassurance that CT scan upper tract imaging and cystoscopy showed no evidence of urinary tract pathology or abnormalities.  She has a rectocele noted which has been present prior and the patient was previously referred to urogynecology.  I have placed an additional referral and instructed the patient to call for an appointment.  I recommend that she return in 6 months time or sooner if she were to identify gross hematuria.    History of Present Illness   Carmencita Guerrero is a 59 y.o. female who presents for evaluation of gross hematuria/chronic vaginal bleeding.  She has been evaluated by gynecology.  On a recent CT scan performed at [facility] in the 2024 possible endometrial thickening was identified.  Otherwise the upper tracts and bladder appeared normal on the CT scan.  Cystoscopy is recommended to complete the workup.    On recent gynecological evaluation a rectocele is noted.  The patient was given information regarding a pessary and referred to urogynecology.    Review of Systems       Objective   /80 (BP Location: Left arm, Patient Position: Sitting, Cuff Size: Standard)   Pulse 77   Ht 5' 5\" (1.651 m)   Wt 134 kg (295 lb)   LMP  (LMP Unknown)   SpO2 96%   BMI 49.09 kg/m²     Physical Exam GEN she is in no acute distress.  Her abdomen is soft nontender nondistended.    Results  No results found for: \"PSA\"  Lab Results   Component Value Date    GLUCOSE 127 2015    CALCIUM 9.3 2024     " 02/26/2015    K 4.0 11/23/2024    CO2 26 11/23/2024     11/23/2024    BUN 8 11/23/2024    CREATININE 0.53 11/23/2024     Lab Results   Component Value Date    WBC 6.81 08/08/2022    HGB 13.8 08/08/2022    HCT 41.8 08/08/2022    MCV 91 08/08/2022     08/08/2022       Office Urine Dip  Recent Results (from the past hour)   POCT urine dip    Collection Time: 01/03/25  9:06 AM   Result Value Ref Range    LEUKOCYTE ESTERASE,UA -     NITRITE,UA -     SL AMB POCT UROBILINOGEN 0.2     POCT URINE PROTEIN -      PH,UA 7.5     BLOOD,UA moderate     SPECIFIC GRAVITY,UA 1.010     KETONES,UA -     BILIRUBIN,UA -     GLUCOSE, UA -      COLOR,UA yellow     CLARITY,UA clear    ]           Cystoscopy     Date/Time  1/3/2025 9:15 AM     Performed by  Gus Chapa MD   Authorized by  Gus Chapa MD         Procedure Details:  Procedure type: cystoscopy           Cystoscopy Procedure note:    Risk and benefits of flexible cystoscopy were discussed. Informed consent was obtained. A urine dip is adequate for cystoscopy. The patient was placed into the modified supine position. Her genitalia was prepped and draped in a sterile fashion. Viscous lidocaine was instilled into the urethra. Flexible cystoscopy was then performed. The bladder was thoroughly inspected. Both ureteral orifices were visualized with clear efflux of urine. The bladder mucosa was thoroughly inspected. There was no evidence of mucosal abnormalities, stones, or lesions. Retroflexion was normal. Overall this was a negative cystoscopy. A female office staff member was present throughout the entire procedure.

## 2025-01-03 NOTE — LETTER
January 3, 2025     Leslie Lisa DO  [address] [place] [address]   [address]  [facility] [address]    Patient: Carmencita Guerrero   YOB: 1965   Date of Visit: 1/3/2025       Dear Dr. Lisa:    Thank you for referring Carmencita Guerrero to me for evaluation. Below are my notes for this consultation.    If you have questions, please do not hesitate to call me. I look forward to following your patient along with you.         Sincerely,        Gus Chapa MD        CC: MD Edgard Carballo MD Frank Jeremy Tamarkin, MD  1/3/2025  9:36 AM  Sign when Signing Visit  Name: Carmencita Guerrero      : 1965      MRN: [id number]  Encounter Provider: Gus Chapa MD  Encounter Date: 1/3/2025   Encounter department: [facility] [place] [facility]  :  Assessment & Plan  Hematuria, unspecified type    Orders:  •  POCT urine dip    Rectocele           Impression: Resolved gross hematuria, rectocele    Plan: I provided the patient with reassurance that CT scan upper tract imaging and cystoscopy showed no evidence of urinary tract pathology or abnormalities.  She has a rectocele noted which has been present prior and the patient was previously referred to urogynecology.  I have placed an additional referral and instructed the patient to call for an appointment.  I recommend that she return in 6 months time or sooner if she were to identify gross hematuria.    History of Present Illness  Carmencita Guerrero is a 59 y.o. female who presents for evaluation of gross hematuria/chronic vaginal bleeding.  She has been evaluated by gynecology.  On a recent CT scan performed at [facility] in the fall  possible endometrial thickening was identified.  Otherwise the upper tracts and bladder appeared normal on the CT scan.  Cystoscopy is recommended to complete the workup.    On recent gynecological evaluation a rectocele is noted.  The  "patient was given information regarding a pessary and referred to urogynecology.    Review of Systems       Objective  /80 (BP Location: Left arm, Patient Position: Sitting, Cuff Size: Standard)   Pulse 77   Ht 5' 5\" (1.651 m)   Wt 134 kg (295 lb)   LMP  (LMP Unknown)   SpO2 96%   BMI 49.09 kg/m²     Physical Exam GEN she is in no acute distress.  Her abdomen is soft nontender nondistended.    Results  No results found for: \"PSA\"  Lab Results   Component Value Date    GLUCOSE 127 02/26/2015    CALCIUM 9.3 11/23/2024     02/26/2015    K 4.0 11/23/2024    CO2 26 11/23/2024     11/23/2024    BUN 8 11/23/2024    CREATININE 0.53 11/23/2024     Lab Results   Component Value Date    WBC 6.81 08/08/2022    HGB 13.8 08/08/2022    HCT 41.8 08/08/2022    MCV 91 08/08/2022     08/08/2022       Office Urine Dip  Recent Results (from the past hour)   POCT urine dip    Collection Time: 01/03/25  9:06 AM   Result Value Ref Range    LEUKOCYTE ESTERASE,UA -     NITRITE,UA -     SL AMB POCT UROBILINOGEN 0.2     POCT URINE PROTEIN -      PH,UA 7.5     BLOOD,UA moderate     SPECIFIC GRAVITY,UA 1.010     KETONES,UA -     BILIRUBIN,UA -     GLUCOSE, UA -      COLOR,UA yellow     CLARITY,UA clear    ]           Cystoscopy     Date/Time  1/3/2025 9:15 AM     Performed by  Gus Chapa MD   Authorized by  Gus Chapa MD         Procedure Details:  Procedure type: cystoscopy           Cystoscopy Procedure note:    Risk and benefits of flexible cystoscopy were discussed. Informed consent was obtained. A urine dip is adequate for cystoscopy. The patient was placed into the modified supine position. Her genitalia was prepped and draped in a sterile fashion. Viscous lidocaine was instilled into the urethra. Flexible cystoscopy was then performed. The bladder was thoroughly inspected. Both ureteral orifices were visualized with clear efflux of urine. The bladder mucosa was thoroughly inspected. " There was no evidence of mucosal abnormalities, stones, or lesions. Retroflexion was normal. Overall this was a negative cystoscopy. A female office staff member was present throughout the entire procedure.

## 2025-01-10 ENCOUNTER — TELEPHONE (OUTPATIENT)
Dept: OBGYN CLINIC | Facility: CLINIC | Age: 60
End: 2025-01-10

## 2025-01-10 NOTE — TELEPHONE ENCOUNTER
Patient was called yesterday to offer urogyn new patient appointment. Patient stated sevalerie si no longer intererested due to establishing care at urology.

## 2025-03-20 ENCOUNTER — TELEPHONE (OUTPATIENT)
Dept: INTERNAL MEDICINE CLINIC | Facility: CLINIC | Age: 60
End: 2025-03-20

## 2025-06-26 ENCOUNTER — TELEPHONE (OUTPATIENT)
Dept: GASTROENTEROLOGY | Facility: CLINIC | Age: 60
End: 2025-06-26

## 2025-06-26 ENCOUNTER — TELEPHONE (OUTPATIENT)
Age: 60
End: 2025-06-26

## 2025-06-26 NOTE — TELEPHONE ENCOUNTER
Pt rcvd call for 3 year repeat colonoscopy, but needed an office visit because she currently has a rectal prolaspe  Appt on  12/11/25

## 2025-07-16 ENCOUNTER — OFFICE VISIT (OUTPATIENT)
Dept: GASTROENTEROLOGY | Facility: CLINIC | Age: 60
End: 2025-07-16

## 2025-07-16 VITALS
WEIGHT: 293 LBS | BODY MASS INDEX: 48.82 KG/M2 | HEIGHT: 65 IN | SYSTOLIC BLOOD PRESSURE: 122 MMHG | TEMPERATURE: 99.3 F | DIASTOLIC BLOOD PRESSURE: 84 MMHG

## 2025-07-16 DIAGNOSIS — K62.3 RECTAL PROLAPSE: ICD-10-CM

## 2025-07-16 DIAGNOSIS — Z86.0100 HISTORY OF COLON POLYPS: Primary | ICD-10-CM

## 2025-07-16 DIAGNOSIS — E66.813 CLASS 3 SEVERE OBESITY WITHOUT SERIOUS COMORBIDITY WITH BODY MASS INDEX (BMI) OF 45.0 TO 49.9 IN ADULT, UNSPECIFIED OBESITY TYPE: ICD-10-CM

## 2025-07-16 RX ORDER — MAGNESIUM L-LACTATE 84 MG
84 TABLET, EXTENDED RELEASE ORAL DAILY
COMMUNITY

## 2025-07-16 RX ORDER — POLYETHYLENE GLYCOL 3350 17 G/17G
POWDER, FOR SOLUTION ORAL
Qty: 238 G | Refills: 0 | Status: SHIPPED | OUTPATIENT
Start: 2025-07-16

## 2025-07-16 RX ORDER — POLYETHYLENE GLYCOL 3350 17 G/17G
17 POWDER, FOR SOLUTION ORAL ONCE
Qty: 238 G | Refills: 0 | Status: CANCELLED | OUTPATIENT
Start: 2025-07-16 | End: 2025-07-16

## 2025-07-16 RX ORDER — BISACODYL 5 MG/1
10 TABLET, DELAYED RELEASE ORAL ONCE
Qty: 2 TABLET | Refills: 0 | Status: SHIPPED | OUTPATIENT
Start: 2025-07-16 | End: 2025-07-16

## 2025-07-16 RX ORDER — SODIUM CHLORIDE, SODIUM LACTATE, POTASSIUM CHLORIDE, CALCIUM CHLORIDE 600; 310; 30; 20 MG/100ML; MG/100ML; MG/100ML; MG/100ML
125 INJECTION, SOLUTION INTRAVENOUS CONTINUOUS
Status: CANCELLED | OUTPATIENT
Start: 2025-07-16

## 2025-08-01 ENCOUNTER — ANESTHESIA EVENT (OUTPATIENT)
Dept: GASTROENTEROLOGY | Facility: HOSPITAL | Age: 60
End: 2025-08-01
Payer: COMMERCIAL

## 2025-08-01 ENCOUNTER — ANESTHESIA (OUTPATIENT)
Dept: GASTROENTEROLOGY | Facility: HOSPITAL | Age: 60
End: 2025-08-01
Payer: COMMERCIAL

## 2025-08-01 ENCOUNTER — HOSPITAL ENCOUNTER (OUTPATIENT)
Dept: GASTROENTEROLOGY | Facility: HOSPITAL | Age: 60
Setting detail: OUTPATIENT SURGERY
Discharge: HOME | End: 2025-08-01
Attending: STUDENT IN AN ORGANIZED HEALTH CARE EDUCATION/TRAINING PROGRAM
Payer: COMMERCIAL

## 2025-08-01 VITALS
BODY MASS INDEX: 48.82 KG/M2 | SYSTOLIC BLOOD PRESSURE: 130 MMHG | WEIGHT: 293 LBS | TEMPERATURE: 96.3 F | DIASTOLIC BLOOD PRESSURE: 80 MMHG | HEIGHT: 65 IN | RESPIRATION RATE: 18 BRPM | HEART RATE: 78 BPM | OXYGEN SATURATION: 97 %

## 2025-08-01 DIAGNOSIS — Z86.0100 HISTORY OF COLON POLYPS: ICD-10-CM

## 2025-08-01 PROCEDURE — 45378 DIAGNOSTIC COLONOSCOPY: CPT | Performed by: INTERNAL MEDICINE

## 2025-08-01 RX ORDER — LIDOCAINE HYDROCHLORIDE 20 MG/ML
INJECTION, SOLUTION EPIDURAL; INFILTRATION; INTRACAUDAL; PERINEURAL AS NEEDED
Status: DISCONTINUED | OUTPATIENT
Start: 2025-08-01 | End: 2025-08-01

## 2025-08-01 RX ORDER — PROPOFOL 10 MG/ML
INJECTION, EMULSION INTRAVENOUS AS NEEDED
Status: DISCONTINUED | OUTPATIENT
Start: 2025-08-01 | End: 2025-08-01

## 2025-08-01 RX ORDER — SODIUM CHLORIDE 9 MG/ML
INJECTION, SOLUTION INTRAVENOUS CONTINUOUS PRN
Status: DISCONTINUED | OUTPATIENT
Start: 2025-08-01 | End: 2025-08-01

## 2025-08-01 RX ORDER — PROPOFOL 10 MG/ML
INJECTION, EMULSION INTRAVENOUS CONTINUOUS PRN
Status: DISCONTINUED | OUTPATIENT
Start: 2025-08-01 | End: 2025-08-01

## 2025-08-01 RX ORDER — SODIUM CHLORIDE, SODIUM LACTATE, POTASSIUM CHLORIDE, CALCIUM CHLORIDE 600; 310; 30; 20 MG/100ML; MG/100ML; MG/100ML; MG/100ML
125 INJECTION, SOLUTION INTRAVENOUS CONTINUOUS
Status: DISCONTINUED | OUTPATIENT
Start: 2025-08-01 | End: 2025-08-05 | Stop reason: HOSPADM

## 2025-08-01 RX ADMIN — PROPOFOL 100 MG: 10 INJECTION, EMULSION INTRAVENOUS at 11:40

## 2025-08-01 RX ADMIN — PROPOFOL 100 MCG/KG/MIN: 10 INJECTION, EMULSION INTRAVENOUS at 11:40

## 2025-08-01 RX ADMIN — SODIUM CHLORIDE: 0.9 INJECTION, SOLUTION INTRAVENOUS at 11:33

## 2025-08-01 RX ADMIN — LIDOCAINE HYDROCHLORIDE 40 MG: 20 INJECTION, SOLUTION EPIDURAL; INFILTRATION; INTRACAUDAL; PERINEURAL at 11:40
